# Patient Record
Sex: FEMALE | Race: WHITE
[De-identification: names, ages, dates, MRNs, and addresses within clinical notes are randomized per-mention and may not be internally consistent; named-entity substitution may affect disease eponyms.]

---

## 2018-04-25 ENCOUNTER — HOSPITAL ENCOUNTER (INPATIENT)
Dept: HOSPITAL 31 - C.ER | Age: 83
LOS: 2 days | Discharge: HOME | DRG: 433 | End: 2018-04-27
Attending: FAMILY MEDICINE | Admitting: FAMILY MEDICINE
Payer: MEDICARE

## 2018-04-25 DIAGNOSIS — Z86.73: ICD-10-CM

## 2018-04-25 DIAGNOSIS — N18.9: ICD-10-CM

## 2018-04-25 DIAGNOSIS — H54.61: ICD-10-CM

## 2018-04-25 DIAGNOSIS — K76.9: ICD-10-CM

## 2018-04-25 DIAGNOSIS — F03.90: ICD-10-CM

## 2018-04-25 DIAGNOSIS — Z95.0: ICD-10-CM

## 2018-04-25 DIAGNOSIS — E78.5: ICD-10-CM

## 2018-04-25 DIAGNOSIS — I50.22: ICD-10-CM

## 2018-04-25 DIAGNOSIS — I25.10: ICD-10-CM

## 2018-04-25 DIAGNOSIS — K74.60: Primary | ICD-10-CM

## 2018-04-25 DIAGNOSIS — J98.11: ICD-10-CM

## 2018-04-25 DIAGNOSIS — H40.9: ICD-10-CM

## 2018-04-25 DIAGNOSIS — Z51.5: ICD-10-CM

## 2018-04-25 DIAGNOSIS — R18.8: ICD-10-CM

## 2018-04-25 DIAGNOSIS — Z66: ICD-10-CM

## 2018-04-25 DIAGNOSIS — K59.00: ICD-10-CM

## 2018-04-25 LAB
ALBUMIN SERPL-MCNC: 2.6 G/DL (ref 3.5–5)
ALBUMIN/GLOB SERPL: 0.6 {RATIO} (ref 1–2.1)
ALT SERPL-CCNC: 11 U/L (ref 9–52)
APTT BLD: 32 SECONDS (ref 21–34)
AST SERPL-CCNC: 25 U/L (ref 14–36)
BACTERIA #/AREA URNS HPF: (no result) /[HPF]
BASOPHILS # BLD AUTO: 0 K/UL (ref 0–0.2)
BASOPHILS NFR BLD: 0.9 % (ref 0–2)
BILIRUB UR-MCNC: NEGATIVE MG/DL
BUN SERPL-MCNC: 34 MG/DL (ref 7–17)
CALCIUM SERPL-MCNC: 8.6 MG/DL (ref 8.6–10.4)
EOSINOPHIL # BLD AUTO: 0.1 K/UL (ref 0–0.7)
EOSINOPHIL NFR BLD: 1.9 % (ref 0–4)
ERYTHROCYTE [DISTWIDTH] IN BLOOD BY AUTOMATED COUNT: 16.5 % (ref 11.5–14.5)
GFR NON-AFRICAN AMERICAN: 28
GLUCOSE UR STRIP-MCNC: NORMAL MG/DL
HGB BLD-MCNC: 11.4 G/DL (ref 11–16)
INR PPP: 1.1
LEUKOCYTE ESTERASE UR-ACNC: (no result) LEU/UL
LIPASE: 143 U/L (ref 23–300)
LYMPHOCYTES # BLD AUTO: 0.9 K/UL (ref 1–4.3)
LYMPHOCYTES NFR BLD AUTO: 17 % (ref 20–40)
MCH RBC QN AUTO: 29.7 PG (ref 27–31)
MCHC RBC AUTO-ENTMCNC: 33.3 G/DL (ref 33–37)
MCV RBC AUTO: 89.1 FL (ref 81–99)
MONOCYTES # BLD: 0.5 K/UL (ref 0–0.8)
MONOCYTES NFR BLD: 9.3 % (ref 0–10)
NEUTROPHILS # BLD: 3.8 K/UL (ref 1.8–7)
NEUTROPHILS NFR BLD AUTO: 70.9 % (ref 50–75)
NRBC BLD AUTO-RTO: 0 % (ref 0–2)
PH UR STRIP: 5 [PH] (ref 5–8)
PLATELET # BLD: 227 K/UL (ref 130–400)
PMV BLD AUTO: 7.3 FL (ref 7.2–11.7)
PROT UR STRIP-MCNC: NEGATIVE MG/DL
PROTHROMBIN TIME: 12.3 SECONDS (ref 9.7–12.2)
RBC # BLD AUTO: 3.83 MIL/UL (ref 3.8–5.2)
RBC # UR STRIP: NEGATIVE /UL
SP GR UR STRIP: 1.01 (ref 1–1.03)
SQUAMOUS EPITHIAL: 7 /HPF (ref 0–5)
UROBILINOGEN UR-MCNC: NORMAL MG/DL (ref 0.2–1)
WBC # BLD AUTO: 5.4 K/UL (ref 4.8–10.8)

## 2018-04-25 NOTE — CT
EXAM:

  CT Abdomen and Pelvis Without Intravenous Contrast



EXAM DATE/TIME:

  Exam ordered 4/25/2018 6:13 PM



CLINICAL HISTORY:

  89 years old, female; Pain and signs and symptoms; Bloating; Abdominal pain; 

Periumbilical; Additional info: Abd pain



TECHNIQUE:

  Axial computed tomography images of the abdomen and pelvis without 

intravenous contrast.  All CT scans at this facility use one or more dose 

reduction techniques, viz.: automated exposure control; ma/kV adjustment per 

patient size (including targeted exams where dose is matched to indication; 

i.e. head); or iterative reconstruction technique.

  Coronal and sagittal reformatted images were created and reviewed.



COMPARISON:

  No relevant prior studies available.



FINDINGS:

  Lung bases:  Subpleural atelectasis is noted in the dependent portion of both 

lung bases..  there is mild cardiomegaly.

  Pleural space:  There are small bilateral pleural effusions, right side 

greater than left.

  Mediastinum:  There is a moderate size hiatal hernia.



 ABDOMEN:

  Liver:  The liver surface is nodular.  There are 3 low-density lesions are 

seen within the liver, all in the right lobe. One in the anterior superior 

segment of the right lobe measuring 7 mm. A second in the anterior segment of 

the right upper lobe beneath the liver capsule measuring 1 cm and a third in 

the posterior inferior segment of the right lobe measuring 6 mm.

  Gallbladder and bile ducts:  Unremarkable.  No calcified stones.  No ductal 

dilation.

  Pancreas:  Unremarkable.  No ductal dilation.

  Spleen:  The spleen measures 8 cm in craniocaudal span.

  Adrenals:  Unremarkable.  No mass.

  Kidneys and ureters:  Unremarkable.  No obstructing stones.  No 

hydronephrosis.

  Stomach and bowel:  Unremarkable.  No obstruction.  No mucosal thickening.



 PELVIS:

  Appendix:  No findings to suggest acute appendicitis.

  Bladder:  Unremarkable.  No stones.

  Reproductive:  The uterus is absent.



 ABDOMEN and PELVIS:

  Intraperitoneal space:  There is a large amount of intra-abdominal ascites.  

No free air.

  Bones/joints:  There is fusiform there is a compression fracture noted of L1 

with approximately 40% loss of height of the vertebral body. No retropulsed 

fragments seen. Mild compression deformity is also noted of L4 with 

approximately 20% loss of height of the vertebral body.  There is an apparent 

levoscoliosis of lumbar spine with moderately advanced secondary degenerative 

changes.

  Soft tissues:  There is anasarca.

  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 

maximum diameter of 2.9 cm (proximal aorta diameter 1.9 cm and distal aortic 

diameter 1.6 cm).

  Lymph nodes:  Unremarkable.  No enlarged lymph nodes.

  Tubes, lines and devices:  Pacemaker electrodes are noted within the heart.



IMPRESSION:     

1. Large amount of intra-abdominal ascites with anasarca and small bilateral 

pleural effusions, right side greater than left.



2. Nodular liver suggesting cirrhosis.



3. 3 low-density liver lesions under a centimeter not fully characterized 

without contrast.



4. 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 

diameter of 2.9 cm.



5. Compression fracture of the L1 and L4 vertebral bodies as described above

## 2018-04-25 NOTE — CP.PCM.HP
Addendum entered and electronically signed by Orestes Gonzáles DO  04/26/18 04:01: 





Elevated BNP


-consider echo after patient has paracentesis done


-AS type murmur heard on exam





Original Note:








<Orestes Gonzáles - Last Filed: 04/25/18 22:18>





History of Present Illness





- History of Present Illness


History of Present Illness: 


CC: abdominal distention





PMD: Dr. Colindres


GI: Dr. Kruse


Code Status: DNR/DNI; Palliative Care consult placed for tomorrow 





This patient is an 89yo Cantonese speaking F (actually 89 according to daughter)

, who is coming to the [facility] from [facility] clinic with severe ascites. The patient first 

noticed her distended abdomen around [place] time in 2017 and originally went 

to another hospital which diagnosed her with constipation initially, and then 

saw that she had ascitic fluid, and decided to give her a prescription for 

lasix which helped mildly. The patient has since run out of lasix for the past 

week, and her abdomen has gotten markedly more distended. The patient denies 

any fevers/chills, or abdominal pain. She admits to shortness of breath, and 

increased constipation in regards to the increasing ascites. She denies any 

current symptoms of fevers/chills, HA, abdominal pain, N/V/D, dysuria/freq/

urgency or lower extremity pain. Daugther states at baseline patient gets 

swollen legs, and that today they seem better than normal. 





PMhx: CAD, HLD not currently on medication, Glaucoma totally blind right eye


Meds: None 


Allergies: Denies


Surgeries: ALBARO 40+ years ago for possible leiomyomas


FamHx: patients father with pancreatic cancer, no other hx of cancer, HTN on 

both sides


Social: lives with daughter, mostly bed bound and needs help ambulating at 

baseline, daughter and home help aid help with all ADLs. Cantonese speaking. 

Non smoker, non drinker. 





Present on Admission





- Present on Admission


Any Indicators Present on Admission: No


History of DVT/PE: No


History of Uncontrolled Diabetes: No


Urinary Catheter: No


Decubitus Ulcer Present: No





Past Patient History





- Past Social History


Smoking Status: Never Smoked





- CARDIAC


Hx Pacemaker: Yes





- NEUROLOGICAL


Hx Dementia: Yes





- RENAL


Hx Chronic Kidney Disease: Yes





- PSYCHIATRIC


Hx Substance Use: No





- SURGICAL HISTORY


Hx Hysterectomy: Yes





Meds


Allergies/Adverse Reactions: 


 Allergies











Allergy/AdvReac Type Severity Reaction Status Date / Time


 


Penicillins Allergy   Verified 04/25/18 18:11


 


Sulfa (Sulfonamide Allergy   Verified 04/25/18 18:11





Antibiotics)     














Physical Exam





- Constitutional


Appears: Non-toxic, No Acute Distress, Chronically Ill (cachetic )





- Head Exam


Head Exam: ATRAUMATIC





- Eye Exam


Eye Exam: EOMI, Normal appearance, PERRL.  absent: Scleral icterus





- ENT Exam


ENT Exam: Mucous Membranes Moist


Additional comments: 





patient is blind in the right eye, glaucoma





- Neck Exam


Neck exam: Negative for: Lymphadenopathy, Meningismus, Thyromegaly





- Respiratory Exam


Respiratory Exam: NORMAL BREATHING PATTERN.  absent: Accessory Muscle Use, 

Chest Wall Tenderness, Decreased Breath Sounds, Clear to Auscultation Bilateral

, Rhonchi, Wheezes, Respiratory Distress, Stridor


Additional comments: 





b/l crackles lung bases





- Cardiovascular Exam


Cardiovascular Exam: REGULAR RHYTHM (strong +4/6 AS like murmur ), RRR, +S1, +S2

, Systolic Murmur.  absent: Clicks, Diastolic murmur, JVD, Rubs





- GI/Abdominal Exam


GI & Abdominal Exam: Distended (extremely distended ), Firm, Normal Bowel 

Sounds.  absent: Guarding, Rebound, Rigid, Tenderness





- Extremities Exam


Extremities exam: Positive for: full ROM, pedal edema (+2 pitting edema, worse 

on left than right).  Negative for: calf tenderness





- Back Exam


Back exam: NORMAL INSPECTION.  absent: CVA tenderness (L), CVA tenderness (R)





- Neurological Exam


Neurological exam: Alert





- Psychiatric Exam


Psychiatric exam: Normal Affect





- Skin


Skin Exam: Warm





Results





- Vital Signs


Recent Vital Signs: 





 Last Vital Signs











Temp  98.3 F   04/25/18 18:07


 


Pulse  75   04/25/18 20:54


 


Resp  18   04/25/18 20:54


 


BP  142/54 L  04/25/18 20:54


 


Pulse Ox  98   04/25/18 21:41














- Labs


Result Diagrams: 


 04/25/18 18:57





 04/25/18 18:57


Labs: 





 Laboratory Results - last 24 hr











  04/25/18 04/25/18 04/25/18





  18:57 18:57 18:57


 


WBC  5.4  


 


RBC  3.83  


 


Hgb  11.4  


 


Hct  34.2  


 


MCV  89.1  


 


MCH  29.7  


 


MCHC  33.3  


 


RDW  16.5 H  


 


Plt Count  227  


 


MPV  7.3  


 


Neut % (Auto)  70.9  


 


Lymph % (Auto)  17.0 L  


 


Mono % (Auto)  9.3  


 


Eos % (Auto)  1.9  


 


Baso % (Auto)  0.9  


 


Neut # (Auto)  3.8  


 


Lymph # (Auto)  0.9 L  


 


Mono # (Auto)  0.5  


 


Eos # (Auto)  0.1  


 


Baso # (Auto)  0.0  


 


PT   12.3 H 


 


INR   1.1 


 


APTT   32 


 


Sodium    142


 


Potassium    4.1


 


Chloride    108 H


 


Carbon Dioxide    23


 


Anion Gap    15


 


BUN    34 H


 


Creatinine    1.7 H


 


Est GFR ( Amer)    34


 


Est GFR (Non-Af Amer)    28


 


Random Glucose    106 H


 


Calcium    8.6


 


Total Bilirubin    0.6


 


AST    25


 


ALT    11


 


Alkaline Phosphatase    70


 


Total Protein    7.3


 


Albumin    2.6 L


 


Globulin    4.7 H


 


Albumin/Globulin Ratio    0.6 L


 


Lipase    143


 


Urine Color   


 


Urine Clarity   


 


Urine pH   


 


Ur Specific Gravity   


 


Urine Protein   


 


Urine Glucose (UA)   


 


Urine Ketones   


 


Urine Blood   


 


Urine Nitrate   


 


Urine Bilirubin   


 


Urine Urobilinogen   


 


Ur Leukocyte Esterase   


 


Urine WBC (Auto)   


 


Urine RBC (Auto)   


 


Ur Squamous Epith Cells   


 


Urine Bacteria   














  04/25/18





  19:19


 


WBC 


 


RBC 


 


Hgb 


 


Hct 


 


MCV 


 


MCH 


 


MCHC 


 


RDW 


 


Plt Count 


 


MPV 


 


Neut % (Auto) 


 


Lymph % (Auto) 


 


Mono % (Auto) 


 


Eos % (Auto) 


 


Baso % (Auto) 


 


Neut # (Auto) 


 


Lymph # (Auto) 


 


Mono # (Auto) 


 


Eos # (Auto) 


 


Baso # (Auto) 


 


PT 


 


INR 


 


APTT 


 


Sodium 


 


Potassium 


 


Chloride 


 


Carbon Dioxide 


 


Anion Gap 


 


BUN 


 


Creatinine 


 


Est GFR ( Amer) 


 


Est GFR (Non-Af Amer) 


 


Random Glucose 


 


Calcium 


 


Total Bilirubin 


 


AST 


 


ALT 


 


Alkaline Phosphatase 


 


Total Protein 


 


Albumin 


 


Globulin 


 


Albumin/Globulin Ratio 


 


Lipase 


 


Urine Color  Yellow


 


Urine Clarity  Hazy


 


Urine pH  5.0


 


Ur Specific Gravity  1.015


 


Urine Protein  Negative


 


Urine Glucose (UA)  Normal


 


Urine Ketones  Negative


 


Urine Blood  Negative


 


Urine Nitrate  Negative


 


Urine Bilirubin  Negative


 


Urine Urobilinogen  Normal


 


Ur Leukocyte Esterase  3+ H


 


Urine WBC (Auto)  77 H


 


Urine RBC (Auto)  5 H


 


Ur Squamous Epith Cells  7 H


 


Urine Bacteria  Rare














Assessment & Plan





- Assessment and Plan (Free Text)


Assessment: 


89yo F admitted for Cirrhosis, 3 Liver lesions, and marked abdominal distention/

ascites





Cirrhosis, liver lesions, marked abdominal distention/ascites


GI Consult; Dr. Kruse; thank you for your help


IR consult; Dr. Gallegos; thank you for  your help


-please refer to full CT scan report for results; advanced cirrhosis, multiple 

liver lesions, as well as likely 7-8L of ascitic fluid present 


-500ml IV fluid bolus as per GI fellow Dr. Galeano


-also no lasix to be given as per GI fellow as well


-f/u hepatitis, AFP, CEA, and ascitic fluid analysis, HIV, RPR, autoimmune 

workup


-patient is DNR/DNI; palliative care consult placed; Bobbi, thank you for 

your help 





Hx of CAD


-patient was on aspirin, stopped 5 years ago


-will hold off until after procedure





Hx of HLD


-f/u lipid panel





Proph


-will hold off on chemical anticoagulation until after procedure


-GI prophylaxis not indicated 


-palliative care consult placed; please appreciate recs for POLST





Case discussed with Dr. Leta Gonzáles PGY2 





Decision To Admit





- Pt Status Changed To:


Hospital Disposition Of: Inpatient





- Admit Certification


Admit to Inpatient:: After my assessment, the patient will require 

hospitalization for at least two midnights.  This is because of the severity of 

symptoms shown, intensity of services needed, and/or the medical risk in this 

patient being treated as an outpatient.





- InPatient:


Physician Admission Certification:: PT will need more than 2 midnights, ascites/

cirrrhosis





- .


Bed Request Type: Regular


Admitting Physician: Teo Gillette





<Teo Gillette - Last Filed: 04/26/18 06:03>





Results





- Vital Signs


Recent Vital Signs: 





 Last Vital Signs











Temp  98.0 F   04/25/18 23:44


 


Pulse  79   04/25/18 23:44


 


Resp  20   04/25/18 23:44


 


BP  139/74   04/25/18 23:44


 


Pulse Ox  97   04/25/18 23:44














- Labs


Result Diagrams: 


 04/25/18 18:57





 04/25/18 18:57


Labs: 





 Laboratory Results - last 24 hr











  04/25/18 04/25/18 04/25/18





  18:57 18:57 18:57


 


WBC  5.4  


 


RBC  3.83  


 


Hgb  11.4  


 


Hct  34.2  


 


MCV  89.1  


 


MCH  29.7  


 


MCHC  33.3  


 


RDW  16.5 H  


 


Plt Count  227  


 


MPV  7.3  


 


Neut % (Auto)  70.9  


 


Lymph % (Auto)  17.0 L  


 


Mono % (Auto)  9.3  


 


Eos % (Auto)  1.9  


 


Baso % (Auto)  0.9  


 


Neut # (Auto)  3.8  


 


Lymph # (Auto)  0.9 L  


 


Mono # (Auto)  0.5  


 


Eos # (Auto)  0.1  


 


Baso # (Auto)  0.0  


 


PT   12.3 H 


 


INR   1.1 


 


APTT   32 


 


Sodium    142


 


Potassium    4.1


 


Chloride    108 H


 


Carbon Dioxide    23


 


Anion Gap    15


 


BUN    34 H


 


Creatinine    1.7 H


 


Est GFR ( Amer)    34


 


Est GFR (Non-Af Amer)    28


 


Random Glucose    106 H


 


Calcium    8.6


 


Total Bilirubin    0.6


 


AST    25


 


ALT    11


 


Alkaline Phosphatase    70


 


Ammonia   


 


NT-Pro-B Natriuret Pep   


 


Total Protein    7.3


 


Albumin    2.6 L


 


Globulin    4.7 H


 


Albumin/Globulin Ratio    0.6 L


 


Lipase    143


 


Urine Color   


 


Urine Clarity   


 


Urine pH   


 


Ur Specific Gravity   


 


Urine Protein   


 


Urine Glucose (UA)   


 


Urine Ketones   


 


Urine Blood   


 


Urine Nitrate   


 


Urine Bilirubin   


 


Urine Urobilinogen   


 


Ur Leukocyte Esterase   


 


Urine WBC (Auto)   


 


Urine RBC (Auto)   


 


Ur Squamous Epith Cells   


 


Urine Bacteria   


 


Hepatitis A IgM Ab   


 


Hep Bs Antigen   


 


Hep B Core IgM Ab   


 


Hepatitis C Antibody   


 


Blood Type   


 


Antibody Screen   














  04/25/18 04/25/18 04/26/18





  19:19 21:58 00:45


 


WBC   


 


RBC   


 


Hgb   


 


Hct   


 


MCV   


 


MCH   


 


MCHC   


 


RDW   


 


Plt Count   


 


MPV   


 


Neut % (Auto)   


 


Lymph % (Auto)   


 


Mono % (Auto)   


 


Eos % (Auto)   


 


Baso % (Auto)   


 


Neut # (Auto)   


 


Lymph # (Auto)   


 


Mono # (Auto)   


 


Eos # (Auto)   


 


Baso # (Auto)   


 


PT   


 


INR   


 


APTT   


 


Sodium   


 


Potassium   


 


Chloride   


 


Carbon Dioxide   


 


Anion Gap   


 


BUN   


 


Creatinine   


 


Est GFR ( Amer)   


 


Est GFR (Non-Af Amer)   


 


Random Glucose   


 


Calcium   


 


Total Bilirubin   


 


AST   


 


ALT   


 


Alkaline Phosphatase   


 


Ammonia   


 


NT-Pro-B Natriuret Pep   


 


Total Protein   


 


Albumin   


 


Globulin   


 


Albumin/Globulin Ratio   


 


Lipase   


 


Urine Color  Yellow  


 


Urine Clarity  Hazy  


 


Urine pH  5.0  


 


Ur Specific Gravity  1.015  


 


Urine Protein  Negative  


 


Urine Glucose (UA)  Normal  


 


Urine Ketones  Negative  


 


Urine Blood  Negative  


 


Urine Nitrate  Negative  


 


Urine Bilirubin  Negative  


 


Urine Urobilinogen  Normal  


 


Ur Leukocyte Esterase  3+ H  


 


Urine WBC (Auto)  77 H  


 


Urine RBC (Auto)  5 H  


 


Ur Squamous Epith Cells  7 H  


 


Urine Bacteria  Rare  


 


Hepatitis A IgM Ab    Negative


 


Hep Bs Antigen    Negative


 


Hep B Core IgM Ab    Negative


 


Hepatitis C Antibody    Negative


 


Blood Type   O POSITIVE 


 


Antibody Screen   Negative 














  04/26/18 04/26/18





  01:00 01:00


 


WBC  


 


RBC  


 


Hgb  


 


Hct  


 


MCV  


 


MCH  


 


MCHC  


 


RDW  


 


Plt Count  


 


MPV  


 


Neut % (Auto)  


 


Lymph % (Auto)  


 


Mono % (Auto)  


 


Eos % (Auto)  


 


Baso % (Auto)  


 


Neut # (Auto)  


 


Lymph # (Auto)  


 


Mono # (Auto)  


 


Eos # (Auto)  


 


Baso # (Auto)  


 


PT  


 


INR  


 


APTT  


 


Sodium  


 


Potassium  


 


Chloride  


 


Carbon Dioxide  


 


Anion Gap  


 


BUN  


 


Creatinine  


 


Est GFR ( Amer)  


 


Est GFR (Non-Af Amer)  


 


Random Glucose  


 


Calcium  


 


Total Bilirubin  


 


AST  


 


ALT  


 


Alkaline Phosphatase  


 


Ammonia  19 


 


NT-Pro-B Natriuret Pep   2440 H


 


Total Protein  


 


Albumin  


 


Globulin  


 


Albumin/Globulin Ratio  


 


Lipase  


 


Urine Color  


 


Urine Clarity  


 


Urine pH  


 


Ur Specific Gravity  


 


Urine Protein  


 


Urine Glucose (UA)  


 


Urine Ketones  


 


Urine Blood  


 


Urine Nitrate  


 


Urine Bilirubin  


 


Urine Urobilinogen  


 


Ur Leukocyte Esterase  


 


Urine WBC (Auto)  


 


Urine RBC (Auto)  


 


Ur Squamous Epith Cells  


 


Urine Bacteria  


 


Hepatitis A IgM Ab  


 


Hep Bs Antigen  


 


Hep B Core IgM Ab  


 


Hepatitis C Antibody  


 


Blood Type  


 


Antibody Screen  














Assessment & Plan





- Date & Time


Date: 04/26/18 (I have seen and examined the patient.  I agree with the 

findings and plan of care as documented by Dr. Gonzáles.  Patient with ascites 

related to cirrhosis.  Denies history of alcohol or hepatitis.  Consult to GI 

and IR for paracentesis. Monitor for acute changes.)


Time: 06:02





Attending/Attestation





- Attestation


I have personally seen and examined this patient.: Yes


I have fully participated in the care of the patient.: Yes


I have reviewed all pertinent clinical information: Yes

## 2018-04-25 NOTE — C.PDOC
History Of Present Illness


89 year old female who has been in this [place] years, whose PMHx includes 

Coronary Artery Disease, s/p pacemaker, Chronic Kidney Disease, and Dementia 

presents to the ED for evaluation of abdominal distention which has been 

worsening over the past few months. Patient states she was evaluated in [facility] for abdominal distention in December 2017 and was diagnosed 

with constipation. The distention then worsened in February 2018. She returned 

to [facility] for evaluation but did not undergo paracentesis and was discharged with 

prescription for diuretics. Patient states she took the diuretics for 1 month 

with minimal improvement. She then ran out of her medication and was advised by 

her PMD that she does not need to continue use any longer. Patient notes her 

distention has increased and today she experienced some shortness of breath due 

to her abdominal girth. Patient also seemed more lethargic than usual. She went 

to 's office for evaluation and was advised to go to the ED. She denies 

fever, chills, nausea, and vomiting. 





PMD: Dr. Colindres 


Time Seen by Provider: 04/25/18 18:06


Chief Complaint (Nursing): GI Problem


History Per: Patient


History/Exam Limitations: no limitations


Onset/Duration Of Symptoms: Days


Current Symptoms Are (Timing): Still Present


Additional History Per: Patient





Past Medical History


Reviewed: Historical Data, Nursing Documentation, Vital Signs


Vital Signs: 


 Last Vital Signs











Temp  98.3 F   04/25/18 18:07


 


Pulse  75   04/25/18 20:54


 


Resp  18   04/25/18 20:54


 


BP  142/54 L  04/25/18 20:54


 


Pulse Ox  98   04/25/18 21:24














- Medical History


PMH: CAD, Dementia, Chronic Kidney Disease


Surgical History: Pacemaker


Family History: States: Unknown Family Hx





- Social History


Hx Tobacco Use: No


Hx Alcohol Use: No


Hx Substance Use: No





- Immunization History


Hx Tetanus Toxoid Vaccination: No


Hx Influenza Vaccination: No


Hx Pneumococcal Vaccination: No





Review Of Systems


Respiratory: Positive for: Shortness of Breath


Gastrointestinal: Positive for: Other (abdominal distention ).  Negative for: 

Nausea, Vomiting





Physical Exam





- Physical Exam


Appears: Non-toxic, No Acute Distress, Other (comfortable, no signs of 

respiratory distress )


Skin: Normal Color, Warm, Dry


Head: Atraumatic, Normacephalic


Eye(s): bilateral: Normal Inspection


Oral Mucosa: Moist


Neck: Supple


Chest: Symmetrical, No Deformity, No Tenderness


Cardiovascular: Rhythm Regular, No Murmur


Respiratory: Rales (bilteral basilar), No Rhonchi, No Wheezing


Gastrointestinal/Abdominal: No Tenderness, Distention, No Guarding, No Rebound, 

Other (tense, with fluid waves)


Extremity: Capillary Refill (less than 2 seconds), Other (2+ pitting edema to 

bilateral lower extremities )


Pulses: Left Dorsalis Pedis: Normal, Right Dorsalis Pedis: Normal


Neurological/Psych: Oriented x3, Normal Speech, Normal Cognition





ED Course And Treatment





- Laboratory Results


Result Diagrams: 


 04/25/18 18:57





 04/25/18 18:57


Lab Interpretation: Abnormal (BUN 34, CR 1.7, Urine WBC 77 with leukocyte 

esterase)


ECG Rhythm: AV Paced


ECG Interpretation: No Acute Changes


O2 Sat by Pulse Oximetry: 98 (on RA)


Pulse Ox Interpretation: Normal





- Radiology


CXR: Interpreted by Me


CXR Interpretation: Yes: Cardiomegaly, Other (mild pleural effusion)





- CT Scan/US


  ** No standard instances


Other Rad Studies (CT/US): Read By Radiologist, Radiology Report Reviewed


CT/US Interpretation: EXAM:  CT Abdomen and Pelvis Without Intravenous 

Contrast.  EXAM DATE/TIME:  Exam ordered 4/25/2018 6:13 PM.  CLINICAL HISTORY:  

89 years old, female; Pain and signs and symptoms; Bloating; Abdominal pain; 

Periumbilical;.  Additional info: Abd pain.  TECHNIQUE:  Axial computed 

tomography images of the abdomen and pelvis without intravenous contrast. All 

CT.  scans at this facility use one or more dose reduction techniques, viz.: 

automated exposure control;.  ma/kV adjustment per patient size (including 

targeted exams where dose is matched to indication; i.e.  head); or iterative 

reconstruction technique.  Coronal and sagittal reformatted images were created 

and reviewed.  COMPARISON:  No relevant prior studies available.  FINDINGS:  

Lung bases: Subpleural atelectasis is noted in the dependent portion of both 

lung bases.. there is.  mild cardiomegaly.  Pleural space: There are small 

bilateral pleural effusions, right side greater than left.  Mediastinum: There 

is a moderate size hiatal hernia.  ABDOMEN:Liver: The liver surface is nodular. 

There are 3 low-density lesions are seen within the liver, all in.  the right 

lobe. One in the anterior superior segment of the right lobe measuring 7 mm. A 

second in the.  anterior segment of the right upper lobe beneath the liver 

capsule measuring 1 cm and a third in the.  posterior inferior segment of the 

right lobe measuring 6 mm.  Gallbladder and bile ducts: Unremarkable. No 

calcified stones. No ductal dilation.  Pancreas: Unremarkable. No ductal 

dilation.  Spleen: The spleen measures 8 cm in craniocaudal span.  Adrenals: 

Unremarkable. No mass.  Kidneys and ureters: Unremarkable. No obstructing 

stones. No hydronephrosis.  Stomach and bowel: Unremarkable. No obstruction. No 

mucosal thickening.  PELVIS:  Appendix: No findings to suggest acute 

appendicitis.  Bladder: Unremarkable. No stones.  Reproductive: The uterus is 

absent.  ABDOMEN and PELVIS:  Intraperitoneal space: There is a large amount of 

intra-abdominal ascites. No free air.  Bones/joints: There is fusiform there is 

a compression fracture noted of L1 with approximately 40%.  loss of height of 

the vertebral body. No retropulsed fragments seen. Mild compression deformity 

is.  also noted of L4 with approximately 20% loss of height of the vertebral 

body. There is an apparent.  levoscoliosis of lumbar spine with moderately 

advanced secondary degenerative changes.  Soft tissues: There is anasarca.  

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 

maximum diameter of 2.9.  cm (proximal aorta diameter 1.9 cm and distal aortic 

diameter 1.6 cm).  Lymph nodes: Unremarkable. No enlarged lymph nodes.  Tubes, 

lines and devices: Pacemaker electrodes are noted within the heart.IMPRESSION:  

1. Large amount of intra-abdominal ascites with anasarca and small bilateral 

pleural effusions, right.  side greater than left.  2. Nodular liver suggesting 

cirrhosis.  3. 3 low-density liver lesions under a centimeter not fully 

characterized without contrast.  4. Aneurysmal dilatation of the infrarenal 

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  5. Compression fracture of 

the L1 and L4 vertebral bodies as described above


Progress Note: Bloodwork, urinalysis, CXR, CT A/P, and EKG ordered and reviewed.


Reevaluation Time: 21:24


Reassessment Condition: Unchanged





- Physician Consult Information


Time Consulting Physician Contacted: 21:24


Physician Contacted: Teo Gillette


Outcome Of Conversation: Patient to be admitted for treatment of cirrhosis with 

ascites.





Disposition





- Disposition


Disposition: HOSPITALIZED


Disposition Time: 21:41


Condition: FAIR





- POA


Present On Arrival: None





- Clinical Impression


Clinical Impression: 


 Cirrhosis of liver, Ascites








- Scribe Statement


The provider has reviewed the documentation as recorded by the Scribe (Lana Marin)


Provider Attestation: 








All medical record entries made by the Scribe were at my direction and 

personally dictated by me. I have reviewed the chart and agree that the record 

accurately reflects my personal performance of the history, physical exam, 

medical decision making, and the department course for this patient. I have 

also personally directed, reviewed, and agree with the discharge instructions 

and disposition.

## 2018-04-26 LAB
ALBUMIN SERPL-MCNC: 2.3 G/DL (ref 3.5–5)
ALBUMIN/GLOB SERPL: 0.5 {RATIO} (ref 1–2.1)
ALPHA FETO PROTEIN: 1 NG/ML (ref 0–7.5)
ALT SERPL-CCNC: 6 U/L (ref 9–52)
APPEARANCE FLD: CLEAR
AST SERPL-CCNC: 29 U/L (ref 14–36)
BASOPHILS # BLD AUTO: 0.1 K/UL (ref 0–0.2)
BASOPHILS NFR BLD: 1.2 % (ref 0–2)
BODY FLD TYPE: (no result)
BODY FLUID MONO/MACROPHAGE: 35 % (ref 0–0)
BUN SERPL-MCNC: 33 MG/DL (ref 7–17)
CALCIUM SERPL-MCNC: 8.5 MG/DL (ref 8.6–10.4)
CELL CNT PNL FLD: 100 (ref 0–0)
EOSINOPHIL # BLD AUTO: 0.1 K/UL (ref 0–0.7)
EOSINOPHIL NFR BLD: 3.3 % (ref 0–4)
ERYTHROCYTE [DISTWIDTH] IN BLOOD BY AUTOMATED COUNT: 16.5 % (ref 11.5–14.5)
GFR NON-AFRICAN AMERICAN: 33
HDLC SERPL-MCNC: 21 MG/DL (ref 30–70)
HEPATITIS A IGM: NEGATIVE
HEPATITIS B CORE AB: NEGATIVE
HEPATITIS C ANTIBODY: NEGATIVE
HGB BLD-MCNC: 10.6 G/DL (ref 11–16)
HIV 1&2 ANTIBODY: NEGATIVE
INR PPP: 1.2
LDLC SERPL-MCNC: 83 MG/DL (ref 0–129)
LYMPHOCYTES # BLD AUTO: 0.9 K/UL (ref 1–4.3)
LYMPHOCYTES NFR BLD AUTO: 19.2 % (ref 20–40)
MCH RBC QN AUTO: 30.2 PG (ref 27–31)
MCHC RBC AUTO-ENTMCNC: 33.6 G/DL (ref 33–37)
MCV RBC AUTO: 89.8 FL (ref 81–99)
MONOCYTES # BLD: 0.4 K/UL (ref 0–0.8)
MONOCYTES NFR BLD: 8.3 % (ref 0–10)
NEUTROPHILS # BLD: 3.1 K/UL (ref 1.8–7)
NEUTROPHILS NFR BLD AUTO: 68 % (ref 50–75)
NRBC BLD AUTO-RTO: 0 % (ref 0–2)
PLATELET # BLD: 199 K/UL (ref 130–400)
PMV BLD AUTO: 7.5 FL (ref 7.2–11.7)
PROTHROMBIN TIME: 12.9 SECONDS (ref 9.7–12.2)
RBC # BLD AUTO: 3.51 MIL/UL (ref 3.8–5.2)
RBC # FLD AUTO: 8 /MM3 (ref 0–0)
WBC # BLD AUTO: 4.5 K/UL (ref 4.8–10.8)
WBC # FLD: 12 /MM3 (ref 0–300)

## 2018-04-26 RX ADMIN — CIPROFLOXACIN SCH MLS/HR: 2 INJECTION, SOLUTION INTRAVENOUS at 11:59

## 2018-04-26 RX ADMIN — ENOXAPARIN SODIUM SCH: 30 INJECTION SUBCUTANEOUS at 12:01

## 2018-04-26 RX ADMIN — ALBUMIN (HUMAN) SCH GM: 12.5 INJECTION, SOLUTION INTRAVENOUS at 18:30

## 2018-04-26 RX ADMIN — ALBUMIN (HUMAN) SCH GM: 12.5 INJECTION, SOLUTION INTRAVENOUS at 19:52

## 2018-04-26 RX ADMIN — ALBUMIN (HUMAN) SCH: 12.5 INJECTION, SOLUTION INTRAVENOUS at 18:38

## 2018-04-26 RX ADMIN — ALBUMIN (HUMAN) SCH: 12.5 INJECTION, SOLUTION INTRAVENOUS at 18:39

## 2018-04-26 RX ADMIN — CIPROFLOXACIN SCH MLS/HR: 2 INJECTION, SOLUTION INTRAVENOUS at 21:35

## 2018-04-26 RX ADMIN — ALBUMIN (HUMAN) SCH GM: 12.5 INJECTION, SOLUTION INTRAVENOUS at 17:19

## 2018-04-26 RX ADMIN — ALBUMIN (HUMAN) SCH GM: 12.5 INJECTION, SOLUTION INTRAVENOUS at 17:30

## 2018-04-26 NOTE — CP.PCM.CON
<Reuben Rashid - Last Filed: 04/26/18 08:48>





History of Present Illness





- History of Present Illness


History of Present Illness: 


PGY5 GI Fellow Consult Note


Patient is an 89yo Chinese female with PMHx significant for CAD s/p PCI, H/O 

pacemaker insertion, CKD, TIA, dementia, previous subdural hemorrhage following 

a fall who presented to the ED after evaluation in our office yesterday for 

worsening abdominal distention. In early January, the patient's daughter 

noticed that she was developing lower extremity edema and abdominal distention. 

She monitored this at home for several weeks prior to bringing her mother to 

[facility] [place], [place] where she was diagnosed with ascites. Patient was placed on Torsemide and 

Spironolactone and discharged home. No paracentesis was ever performed as they 

informed her daughter she was too high risk for the procedure. Initially, 

symptoms did improve modestly, but recurred within a few weeks. During a house 

call visit, her primary physician noted her abdominal distention and worsening 

shortness of breath and referred her to our practice for further management. In 

the office, she was lethargic and short of breath, thus we referred her to the 

ED for further evaluation and work up. Patient's daughter stated that her mother

's overall health has been declining in the last month. Patient denied nausea, 

vomiting, fever/chills, diarrhea, or rectal bleeding.





12 system ROS limited given clinical condition





PMHx: See HPI


PSHx: Pacemaker, PCI


FHx: No significant family history per daughter


Social: No tobacco, EtOH or illicit drug use


Endo: >5 years ago





Past Patient History





- Past Medical History & Family History


Past Medical History?: Yes





- Past Social History


Smoking Status: Never Smoked





- CARDIAC


Hx Cardia Arrhythmia: Yes


Hx Pacemaker: Yes





- PULMONARY


Hx Respiratory Disorders: No





- NEUROLOGICAL


Hx Dementia: Yes





- HEENT


Hx Blind: Yes (rt.eye)


Hx Glaucoma: Yes (rt.eye)





- RENAL


Hx Chronic Kidney Disease: Yes





- ENDOCRINE/METABOLIC


Hx Endocrine Disorders: No





- HEMATOLOGICAL/ONCOLOGICAL


Hx Anemia: No


Hx Blood Transfusions: No





- MUSCULOSKELETAL/RHEUMATOLOGICAL


Hx Falls: No





- GASTROINTESTINAL


Hx Gastrointestinal Disorders: No





- PSYCHIATRIC


Hx Substance Use: No





- SURGICAL HISTORY


Hx Surgeries: Yes


Hx Hysterectomy: Yes





- ANESTHESIA


Hx Anesthesia: Yes


Hx Anesthesia Reactions: No


Hx Malignant Hyperthermia: No


Has any member of the family had a problem w/ anesthesia?: No





Meds


Allergies/Adverse Reactions: 


 Allergies











Allergy/AdvReac Type Severity Reaction Status Date / Time


 


Penicillins Allergy   Verified 04/25/18 18:11


 


Sulfa (Sulfonamide Allergy   Verified 04/25/18 18:11





Antibiotics)     














- Medications


Medications: 


 Current Medications





Ondansetron HCl (Zofran Inj)  4 mg IVP DAILY@ONCE PRN


   PRN Reason: Nausea/Vomiting


Oxycodone HCl (Oxycodone Immediate Release Tab)  5 mg PO Q6 PRN


   PRN Reason: Pain, severe (8-10)











Physical Exam





- Constitutional


Appears: Non-toxic, No Acute Distress, Chronically Ill





- Eye Exam


Eye Exam: EOMI, PERRL





- ENT Exam


ENT Exam: Mucous Membranes Dry





- Respiratory Exam


Respiratory Exam: Clear to Auscultation Bilateral.  absent: Rales, Rhonchi, 

Wheezes





- Cardiovascular Exam


Cardiovascular Exam: RRR, +S1, +S2





- GI/Abdominal Exam


GI & Abdominal Exam: Distended, Firm, Normal Bowel Sounds, Tenderness.  absent: 

Guarding, Hernia, Organomegaly, Rigid





- Extremities Exam


Extremities exam: Positive for: normal inspection.  Negative for: pedal edema





- Neurological Exam


Neurological exam: Alert, Oriented x3





- Psychiatric Exam


Psychiatric exam: Normal Affect, Normal Mood





- Skin


Skin Exam: Dry, Warm





Results





- Vital Signs


Recent Vital Signs: 


 Last Vital Signs











Temp  98.0 F   04/25/18 23:44


 


Pulse  79   04/25/18 23:44


 


Resp  20   04/25/18 23:44


 


BP  139/74   04/25/18 23:44


 


Pulse Ox  97   04/25/18 23:44














- Labs


Result Diagrams: 


 04/26/18 07:55





 04/25/18 18:57


Labs: 


 Laboratory Results - last 24 hr











  04/25/18 04/25/18 04/25/18





  18:57 18:57 18:57


 


WBC  5.4  


 


RBC  3.83  


 


Hgb  11.4  


 


Hct  34.2  


 


MCV  89.1  


 


MCH  29.7  


 


MCHC  33.3  


 


RDW  16.5 H  


 


Plt Count  227  


 


MPV  7.3  


 


Neut % (Auto)  70.9  


 


Lymph % (Auto)  17.0 L  


 


Mono % (Auto)  9.3  


 


Eos % (Auto)  1.9  


 


Baso % (Auto)  0.9  


 


Neut # (Auto)  3.8  


 


Lymph # (Auto)  0.9 L  


 


Mono # (Auto)  0.5  


 


Eos # (Auto)  0.1  


 


Baso # (Auto)  0.0  


 


PT   12.3 H 


 


INR   1.1 


 


APTT   32 


 


Sodium    142


 


Potassium    4.1


 


Chloride    108 H


 


Carbon Dioxide    23


 


Anion Gap    15


 


BUN    34 H


 


Creatinine    1.7 H


 


Est GFR ( Amer)    34


 


Est GFR (Non-Af Amer)    28


 


Random Glucose    106 H


 


Calcium    8.6


 


Total Bilirubin    0.6


 


AST    25


 


ALT    11


 


Alkaline Phosphatase    70


 


Ammonia   


 


NT-Pro-B Natriuret Pep   


 


Total Protein    7.3


 


Albumin    2.6 L


 


Globulin    4.7 H


 


Albumin/Globulin Ratio    0.6 L


 


Lipase    143


 


Urine Color   


 


Urine Clarity   


 


Urine pH   


 


Ur Specific Gravity   


 


Urine Protein   


 


Urine Glucose (UA)   


 


Urine Ketones   


 


Urine Blood   


 


Urine Nitrate   


 


Urine Bilirubin   


 


Urine Urobilinogen   


 


Ur Leukocyte Esterase   


 


Urine WBC (Auto)   


 


Urine RBC (Auto)   


 


Ur Squamous Epith Cells   


 


Urine Bacteria   


 


Hepatitis A IgM Ab   


 


Hep Bs Antigen   


 


Hep B Core IgM Ab   


 


Hepatitis C Antibody   


 


Blood Type   


 


Antibody Screen   














  04/25/18 04/25/18 04/26/18





  19:19 21:58 00:45


 


WBC   


 


RBC   


 


Hgb   


 


Hct   


 


MCV   


 


MCH   


 


MCHC   


 


RDW   


 


Plt Count   


 


MPV   


 


Neut % (Auto)   


 


Lymph % (Auto)   


 


Mono % (Auto)   


 


Eos % (Auto)   


 


Baso % (Auto)   


 


Neut # (Auto)   


 


Lymph # (Auto)   


 


Mono # (Auto)   


 


Eos # (Auto)   


 


Baso # (Auto)   


 


PT   


 


INR   


 


APTT   


 


Sodium   


 


Potassium   


 


Chloride   


 


Carbon Dioxide   


 


Anion Gap   


 


BUN   


 


Creatinine   


 


Est GFR ( Amer)   


 


Est GFR (Non-Af Amer)   


 


Random Glucose   


 


Calcium   


 


Total Bilirubin   


 


AST   


 


ALT   


 


Alkaline Phosphatase   


 


Ammonia   


 


NT-Pro-B Natriuret Pep   


 


Total Protein   


 


Albumin   


 


Globulin   


 


Albumin/Globulin Ratio   


 


Lipase   


 


Urine Color  Yellow  


 


Urine Clarity  Hazy  


 


Urine pH  5.0  


 


Ur Specific Gravity  1.015  


 


Urine Protein  Negative  


 


Urine Glucose (UA)  Normal  


 


Urine Ketones  Negative  


 


Urine Blood  Negative  


 


Urine Nitrate  Negative  


 


Urine Bilirubin  Negative  


 


Urine Urobilinogen  Normal  


 


Ur Leukocyte Esterase  3+ H  


 


Urine WBC (Auto)  77 H  


 


Urine RBC (Auto)  5 H  


 


Ur Squamous Epith Cells  7 H  


 


Urine Bacteria  Rare  


 


Hepatitis A IgM Ab    Negative


 


Hep Bs Antigen    Negative


 


Hep B Core IgM Ab    Negative


 


Hepatitis C Antibody    Negative


 


Blood Type   O POSITIVE 


 


Antibody Screen   Negative 














  04/26/18 04/26/18





  01:00 01:00


 


WBC  


 


RBC  


 


Hgb  


 


Hct  


 


MCV  


 


MCH  


 


MCHC  


 


RDW  


 


Plt Count  


 


MPV  


 


Neut % (Auto)  


 


Lymph % (Auto)  


 


Mono % (Auto)  


 


Eos % (Auto)  


 


Baso % (Auto)  


 


Neut # (Auto)  


 


Lymph # (Auto)  


 


Mono # (Auto)  


 


Eos # (Auto)  


 


Baso # (Auto)  


 


PT  


 


INR  


 


APTT  


 


Sodium  


 


Potassium  


 


Chloride  


 


Carbon Dioxide  


 


Anion Gap  


 


BUN  


 


Creatinine  


 


Est GFR ( Amer)  


 


Est GFR (Non-Af Amer)  


 


Random Glucose  


 


Calcium  


 


Total Bilirubin  


 


AST  


 


ALT  


 


Alkaline Phosphatase  


 


Ammonia  19 


 


NT-Pro-B Natriuret Pep   2440 H


 


Total Protein  


 


Albumin  


 


Globulin  


 


Albumin/Globulin Ratio  


 


Lipase  


 


Urine Color  


 


Urine Clarity  


 


Urine pH  


 


Ur Specific Gravity  


 


Urine Protein  


 


Urine Glucose (UA)  


 


Urine Ketones  


 


Urine Blood  


 


Urine Nitrate  


 


Urine Bilirubin  


 


Urine Urobilinogen  


 


Ur Leukocyte Esterase  


 


Urine WBC (Auto)  


 


Urine RBC (Auto)  


 


Ur Squamous Epith Cells  


 


Urine Bacteria  


 


Hepatitis A IgM Ab  


 


Hep Bs Antigen  


 


Hep B Core IgM Ab  


 


Hepatitis C Antibody  


 


Blood Type  


 


Antibody Screen  














Assessment & Plan





- Assessment and Plan (Free Text)


Assessment: 





Patient is an 89yo Chinese female with PMHx significant for CAD s/p PCI, H/O 

pacemaker insertion, CKD, TIA, dementia, previous subdural hemorrhage following 

a fall who presented to the ED after evaluation in our office yesterday for 

worsening abdominal distention.


-Cirrhosis of unknown etiology c/b large volume ascites


-CAD


-Systolic CHF with prior echocardiogram with EF 45%


-CKD


-Dementia


Plan: 





-Blood work reviewed - hepatitis serologies negative


-Avoid diruetics with elevated Cr in cirrhotic patient - s/p 500cc IVF infusion

, modest improvement - R/O HRS


-Urine Na, Cr, BUN


-Repeat 500cc IVF NS@100cc/hr


-CT reviewed - large volume ascites noted; 3 liver lesions noted


-If Cr improves, would recommend triple phase CT scan of the abdomen/pelvis


-Check echocardiogram - suspect congestive hepatopathy - BNP noted to be 

elevated


-Recommend diagnostic/therapeutic paracentesis - please send ascitic fluid for 

cell count, culture, total protein and albumin


-Autoimmune work up ordered: MABLE, AMA, ASMA, IgG level


-AFP and CEA ordered; pending





- Date & Time


Date: 04/26/18


Time: 06:30





<Justus Kruse - Last Filed: 04/26/18 11:55>





Meds





- Medications


Medications: 


 Current Medications





Enoxaparin Sodium (Lovenox)  30 mg SC DAILY KRAIG


Ciprofloxacin (Cipro 200mg/100ml D5w)  100 mls @ 133 mls/hr IVPB Q12H KRAIG


   PRN Reason: Protocol


Sodium Chloride (Sodium Chloride 0.9%)  500 mls @ 100 mls/hr IV .Q5H KRAIG


   Stop: 04/26/18 13:59











Results





- Vital Signs


Recent Vital Signs: 


 Last Vital Signs











Temp  97.7 F   04/26/18 07:00


 


Pulse  72   04/26/18 07:00


 


Resp  21   04/26/18 07:00


 


BP  145/66   04/26/18 07:00


 


Pulse Ox  97   04/26/18 07:00














- Labs


Result Diagrams: 


 04/26/18 07:55





 04/26/18 04:00


Labs: 


 Laboratory Results - last 24 hr











  04/25/18 04/25/18 04/25/18





  18:57 18:57 18:57


 


WBC  5.4  


 


RBC  3.83  


 


Hgb  11.4  


 


Hct  34.2  


 


MCV  89.1  


 


MCH  29.7  


 


MCHC  33.3  


 


RDW  16.5 H  


 


Plt Count  227  


 


MPV  7.3  


 


Neut % (Auto)  70.9  


 


Lymph % (Auto)  17.0 L  


 


Mono % (Auto)  9.3  


 


Eos % (Auto)  1.9  


 


Baso % (Auto)  0.9  


 


Neut # (Auto)  3.8  


 


Lymph # (Auto)  0.9 L  


 


Mono # (Auto)  0.5  


 


Eos # (Auto)  0.1  


 


Baso # (Auto)  0.0  


 


PT   12.3 H 


 


INR   1.1 


 


APTT   32 


 


Sodium    142


 


Potassium    4.1


 


Chloride    108 H


 


Carbon Dioxide    23


 


Anion Gap    15


 


BUN    34 H


 


Creatinine    1.7 H


 


Est GFR ( Amer)    34


 


Est GFR (Non-Af Amer)    28


 


Random Glucose    106 H


 


Hemoglobin A1c   


 


Calcium    8.6


 


Total Bilirubin    0.6


 


AST    25


 


ALT    11


 


Alkaline Phosphatase    70


 


Ammonia   


 


NT-Pro-B Natriuret Pep   


 


Total Protein    7.3


 


Albumin    2.6 L


 


Globulin    4.7 H


 


Albumin/Globulin Ratio    0.6 L


 


Triglycerides   


 


Cholesterol   


 


LDL Cholesterol Direct   


 


HDL Cholesterol   


 


Lipase    143


 


Carcinoembryonic Ag   


 


TSH 3rd Generation   


 


Urine Color   


 


Urine Clarity   


 


Urine pH   


 


Ur Specific Gravity   


 


Urine Protein   


 


Urine Glucose (UA)   


 


Urine Ketones   


 


Urine Blood   


 


Urine Nitrate   


 


Urine Bilirubin   


 


Urine Urobilinogen   


 


Ur Leukocyte Esterase   


 


Urine WBC (Auto)   


 


Urine RBC (Auto)   


 


Ur Squamous Epith Cells   


 


Urine Bacteria   


 


IgG   


 


Hepatitis A IgM Ab   


 


Hep Bs Antigen   


 


Hep B Core IgM Ab   


 


Hepatitis C Antibody   


 


HIV 1&2 Antibody Screen   


 


Blood Type   


 


Antibody Screen   














  04/25/18 04/25/18 04/26/18





  19:19 21:58 00:45


 


WBC   


 


RBC   


 


Hgb   


 


Hct   


 


MCV   


 


MCH   


 


MCHC   


 


RDW   


 


Plt Count   


 


MPV   


 


Neut % (Auto)   


 


Lymph % (Auto)   


 


Mono % (Auto)   


 


Eos % (Auto)   


 


Baso % (Auto)   


 


Neut # (Auto)   


 


Lymph # (Auto)   


 


Mono # (Auto)   


 


Eos # (Auto)   


 


Baso # (Auto)   


 


PT   


 


INR   


 


APTT   


 


Sodium   


 


Potassium   


 


Chloride   


 


Carbon Dioxide   


 


Anion Gap   


 


BUN   


 


Creatinine   


 


Est GFR ( Amer)   


 


Est GFR (Non-Af Amer)   


 


Random Glucose   


 


Hemoglobin A1c   


 


Calcium   


 


Total Bilirubin   


 


AST   


 


ALT   


 


Alkaline Phosphatase   


 


Ammonia   


 


NT-Pro-B Natriuret Pep   


 


Total Protein   


 


Albumin   


 


Globulin   


 


Albumin/Globulin Ratio   


 


Triglycerides   


 


Cholesterol   


 


LDL Cholesterol Direct   


 


HDL Cholesterol   


 


Lipase   


 


Carcinoembryonic Ag   


 


TSH 3rd Generation   


 


Urine Color  Yellow  


 


Urine Clarity  Hazy  


 


Urine pH  5.0  


 


Ur Specific Gravity  1.015  


 


Urine Protein  Negative  


 


Urine Glucose (UA)  Normal  


 


Urine Ketones  Negative  


 


Urine Blood  Negative  


 


Urine Nitrate  Negative  


 


Urine Bilirubin  Negative  


 


Urine Urobilinogen  Normal  


 


Ur Leukocyte Esterase  3+ H  


 


Urine WBC (Auto)  77 H  


 


Urine RBC (Auto)  5 H  


 


Ur Squamous Epith Cells  7 H  


 


Urine Bacteria  Rare  


 


IgG   


 


Hepatitis A IgM Ab    Negative


 


Hep Bs Antigen    Negative


 


Hep B Core IgM Ab    Negative


 


Hepatitis C Antibody    Negative


 


HIV 1&2 Antibody Screen   


 


Blood Type   O POSITIVE 


 


Antibody Screen   Negative 














  04/26/18 04/26/18 04/26/18





  01:00 01:00 04:00


 


WBC   


 


RBC   


 


Hgb   


 


Hct   


 


MCV   


 


MCH   


 


MCHC   


 


RDW   


 


Plt Count   


 


MPV   


 


Neut % (Auto)   


 


Lymph % (Auto)   


 


Mono % (Auto)   


 


Eos % (Auto)   


 


Baso % (Auto)   


 


Neut # (Auto)   


 


Lymph # (Auto)   


 


Mono # (Auto)   


 


Eos # (Auto)   


 


Baso # (Auto)   


 


PT   


 


INR   


 


APTT   


 


Sodium    145


 


Potassium    4.2


 


Chloride    114 H


 


Carbon Dioxide    23


 


Anion Gap    13


 


BUN    33 H


 


Creatinine    1.5 H


 


Est GFR ( Amer)    40


 


Est GFR (Non-Af Amer)    33


 


Random Glucose    79


 


Hemoglobin A1c   


 


Calcium    8.5 L


 


Total Bilirubin    0.5


 


AST    29


 


ALT    6 L D


 


Alkaline Phosphatase    68


 


Ammonia  19  


 


NT-Pro-B Natriuret Pep   2440 H 


 


Total Protein    6.6


 


Albumin    2.3 L


 


Globulin    4.3 H


 


Albumin/Globulin Ratio    0.5 L


 


Triglycerides    69


 


Cholesterol    140


 


LDL Cholesterol Direct    83


 


HDL Cholesterol    21 L


 


Lipase   


 


Carcinoembryonic Ag    2.2


 


TSH 3rd Generation    1.66


 


Urine Color   


 


Urine Clarity   


 


Urine pH   


 


Ur Specific Gravity   


 


Urine Protein   


 


Urine Glucose (UA)   


 


Urine Ketones   


 


Urine Blood   


 


Urine Nitrate   


 


Urine Bilirubin   


 


Urine Urobilinogen   


 


Ur Leukocyte Esterase   


 


Urine WBC (Auto)   


 


Urine RBC (Auto)   


 


Ur Squamous Epith Cells   


 


Urine Bacteria   


 


IgG   


 


Hepatitis A IgM Ab   


 


Hep Bs Antigen   


 


Hep B Core IgM Ab   


 


Hepatitis C Antibody   


 


HIV 1&2 Antibody Screen   


 


Blood Type   


 


Antibody Screen   














  04/26/18 04/26/18 04/26/18





  04:00 07:55 07:55


 


WBC    4.5 L


 


RBC    3.51 L


 


Hgb    10.6 L


 


Hct    31.5 L


 


MCV    89.8


 


MCH    30.2


 


MCHC    33.6


 


RDW    16.5 H


 


Plt Count    199


 


MPV    7.5


 


Neut % (Auto)    68.0


 


Lymph % (Auto)    19.2 L


 


Mono % (Auto)    8.3


 


Eos % (Auto)    3.3


 


Baso % (Auto)    1.2


 


Neut # (Auto)    3.1


 


Lymph # (Auto)    0.9 L


 


Mono # (Auto)    0.4


 


Eos # (Auto)    0.1


 


Baso # (Auto)    0.1


 


PT   


 


INR   


 


APTT   


 


Sodium   


 


Potassium   


 


Chloride   


 


Carbon Dioxide   


 


Anion Gap   


 


BUN   


 


Creatinine   


 


Est GFR ( Amer)   


 


Est GFR (Non-Af Amer)   


 


Random Glucose   


 


Hemoglobin A1c   4.9 


 


Calcium   


 


Total Bilirubin   


 


AST   


 


ALT   


 


Alkaline Phosphatase   


 


Ammonia   


 


NT-Pro-B Natriuret Pep   


 


Total Protein   


 


Albumin   


 


Globulin   


 


Albumin/Globulin Ratio   


 


Triglycerides   


 


Cholesterol   


 


LDL Cholesterol Direct   


 


HDL Cholesterol   


 


Lipase   


 


Carcinoembryonic Ag   


 


TSH 3rd Generation   


 


Urine Color   


 


Urine Clarity   


 


Urine pH   


 


Ur Specific Gravity   


 


Urine Protein   


 


Urine Glucose (UA)   


 


Urine Ketones   


 


Urine Blood   


 


Urine Nitrate   


 


Urine Bilirubin   


 


Urine Urobilinogen   


 


Ur Leukocyte Esterase   


 


Urine WBC (Auto)   


 


Urine RBC (Auto)   


 


Ur Squamous Epith Cells   


 


Urine Bacteria   


 


IgG   


 


Hepatitis A IgM Ab   


 


Hep Bs Antigen   


 


Hep B Core IgM Ab   


 


Hepatitis C Antibody   


 


HIV 1&2 Antibody Screen  Negative  


 


Blood Type   


 


Antibody Screen   














  04/26/18 04/26/18





  07:55 08:19


 


WBC  


 


RBC  


 


Hgb  


 


Hct  


 


MCV  


 


MCH  


 


MCHC  


 


RDW  


 


Plt Count  


 


MPV  


 


Neut % (Auto)  


 


Lymph % (Auto)  


 


Mono % (Auto)  


 


Eos % (Auto)  


 


Baso % (Auto)  


 


Neut # (Auto)  


 


Lymph # (Auto)  


 


Mono # (Auto)  


 


Eos # (Auto)  


 


Baso # (Auto)  


 


PT  12.9 H 


 


INR  1.2 


 


APTT  


 


Sodium  


 


Potassium  


 


Chloride  


 


Carbon Dioxide  


 


Anion Gap  


 


BUN  


 


Creatinine  


 


Est GFR ( Amer)  


 


Est GFR (Non-Af Amer)  


 


Random Glucose  


 


Hemoglobin A1c  


 


Calcium  


 


Total Bilirubin  


 


AST  


 


ALT  


 


Alkaline Phosphatase  


 


Ammonia  


 


NT-Pro-B Natriuret Pep  


 


Total Protein  


 


Albumin  


 


Globulin  


 


Albumin/Globulin Ratio  


 


Triglycerides  


 


Cholesterol  


 


LDL Cholesterol Direct  


 


HDL Cholesterol  


 


Lipase  


 


Carcinoembryonic Ag  


 


TSH 3rd Generation  


 


Urine Color  


 


Urine Clarity  


 


Urine pH  


 


Ur Specific Gravity  


 


Urine Protein  


 


Urine Glucose (UA)  


 


Urine Ketones  


 


Urine Blood  


 


Urine Nitrate  


 


Urine Bilirubin  


 


Urine Urobilinogen  


 


Ur Leukocyte Esterase  


 


Urine WBC (Auto)  


 


Urine RBC (Auto)  


 


Ur Squamous Epith Cells  


 


Urine Bacteria  


 


IgG   2913.8 H


 


Hepatitis A IgM Ab  


 


Hep Bs Antigen  


 


Hep B Core IgM Ab  


 


Hepatitis C Antibody  


 


HIV 1&2 Antibody Screen  


 


Blood Type  


 


Antibody Screen  














Attending/Attestation





- Attestation


I have personally seen and examined this patient.: Yes


I have fully participated in the care of the patient.: Yes


I have reviewed all pertinent clinical information: Yes


Notes (Text): 





04/26/18 11:47


I have seen and examined patient with GI fellow.  Agree with above 

documentation with the following additions.  In brief, this is a 89 year old 

female with history of CAD s/p stent and PPM, CKD, dementia, who presents to 

hospital with complaint of progressive dyspnea on exertion and abdominal girth.

  Patient is not able to participate in meaningful conversation, additional 

information obtained via chart review, discussion with nursing staff and patient

's daughter.  She describes progressive symptoms since January and was 

initially brought to [facility] [place] for similar complaints, was found to have new onset 

ascites without further workup performed.  She was started on diuretic therapy 

with initial response, however now she has become more symptomatic over the 

past 2 weeks.  She has apparently developed loss of appetite but denies nausea, 

vomiting, fever/chills, or rectal bleeding.  She had an EGD performed over 20 

years ago, no prior colonoscopy.





CAD s/p stent, PPM


CKD


Dementia


Abdominal distention, ascites of unknown etiology





- Low sodium diet as tolerated


- Patient will require diagnostic and therapeutic paracentesis for further 

ascites workup.  Patient will also require post procedure albumin replacement 

therapy given underlying renal insufficiency.


- Diuretics on hold for time being, monitor creatinine


- Obtain echocardiogram


- Obtain AFP given presence of hepatic lesions on CT imaging, will ideally 

require triple phase liver CT when renal function improves


- Obtain viral hepatitis and autoimmune serologies


- Will continue to monitor patient clinical course

## 2018-04-26 NOTE — CP.PCM.CON
History of Present Illness





- History of Present Illness


History of Present Illness: 





Palliative consult requested by Juliet ROSSI for Code status discussion








Patient is a 88 yo lady admitted from GI,  Doctor Wenceslao's office where she was 

refereed to by her PMD for abdominal distention. Per daughter , patient has 

become weak over the last month with difficulties breathing. Abdomen has become 

distended and patient also had difficulties walking. 


Patient was [place] [facility] [place] for similar symptoms and sent home on 

Lasix. Per daughter, Lasix helped for while , than distention returned. 


CT abd upon this admission was significant for subpleural atelctasis and B/L 

pleural effusion. Liver masses detected as well. Per Doctor Wenceslao, patient 

diagnosed with liver cirrhosis of unknown origin. Recommendation was to treat 

symptomatically.








PMH: CAD, PPM, CKD, dementia, subdural hematoma post fall





Soc. Hx: lives at home with daughter Abbie, has HHA for 10 hr a day, 





Fam. Hx: non significant





Review of Systems





- Review of Systems


All systems: reviewed and no additional remarkable complaints except


Review of Systems: 





ROS obtained from nursing due to patient's lethargy. Per nursing patient has 

remained with distended abdomen








Past Patient History





- Past Medical History & Family History


Past Medical History?: Yes





- Past Social History


Smoking Status: Never Smoked





- CARDIAC


Hx Cardia Arrhythmia: Yes


Hx Pacemaker: Yes





- PULMONARY


Hx Respiratory Disorders: No





- NEUROLOGICAL


Hx Dementia: Yes





- HEENT


Hx Blind: Yes (rt.eye)


Hx Glaucoma: Yes (rt.eye)





- RENAL


Hx Chronic Kidney Disease: Yes





- ENDOCRINE/METABOLIC


Hx Endocrine Disorders: No





- HEMATOLOGICAL/ONCOLOGICAL


Hx Anemia: No


Hx Blood Transfusions: No





- MUSCULOSKELETAL/RHEUMATOLOGICAL


Hx Falls: No





- GASTROINTESTINAL


Hx Gastrointestinal Disorders: No





- PSYCHIATRIC


Hx Substance Use: No





- SURGICAL HISTORY


Hx Surgeries: Yes


Hx Hysterectomy: Yes





- ANESTHESIA


Hx Anesthesia: Yes


Hx Anesthesia Reactions: No


Hx Malignant Hyperthermia: No


Has any member of the family had a problem w/ anesthesia?: No





Meds


Allergies/Adverse Reactions: 


 Allergies











Allergy/AdvReac Type Severity Reaction Status Date / Time


 


Penicillins Allergy   Verified 04/25/18 18:11


 


Sulfa (Sulfonamide Allergy   Verified 04/25/18 18:11





Antibiotics)     














- Medications


Medications: 


 Current Medications





Enoxaparin Sodium (Lovenox)  30 mg SC DAILY [facility]


   Last Admin: 04/26/18 12:01 Dose:  Not Given


Ciprofloxacin (Cipro 200mg/100ml D5w)  100 mls @ 133 mls/hr IVPB Q12H [facility]


   PRN Reason: Protocol


   Last Admin: 04/26/18 11:59 Dose:  133 mls/hr


Sodium Chloride (Sodium Chloride 0.9%)  500 mls @ 100 mls/hr IV .Q5H [facility]


   Stop: 04/26/18 13:59


   Last Admin: 04/26/18 12:00 Dose:  100 mls/hr











Physical Exam





- Constitutional


Appears: Chronically Ill





- Head Exam


Head Exam: ATRAUMATIC, NORMAL INSPECTION, NORMOCEPHALIC





- Eye Exam


Additional comments: 





Right eye closed. per daughter patient has lost sight due to glaucoma





- ENT Exam


ENT Exam: Mucous Membranes Moist, Normal Exam





- Neck Exam


Neck exam: Positive for: Normal Inspection





- Respiratory Exam


Respiratory Exam: Decreased Breath Sounds, NORMAL BREATHING PATTERN





- Cardiovascular Exam


Cardiovascular Exam: REGULAR RHYTHM





- GI/Abdominal Exam


GI & Abdominal Exam: Diminished Bowel Sounds, Distended, Firm





- Rectal Exam


Rectal Exam: Deferred





- Extremities Exam


Extremities exam: Positive for: pedal edema





- Back Exam


Back exam: NORMAL INSPECTION





- Neurological Exam


Neurological exam: Alert





- Psychiatric Exam


Psychiatric exam: Normal Affect, Normal Mood





- Skin


Skin Exam: Normal Color, Warm





Results





- Vital Signs


Recent Vital Signs: 


 Last Vital Signs











Temp  97.7 F   04/26/18 07:00


 


Pulse  72   04/26/18 07:00


 


Resp  21   04/26/18 07:00


 


BP  145/66   04/26/18 07:00


 


Pulse Ox  97   04/26/18 07:00














- Labs


Result Diagrams: 


 04/26/18 07:55





 04/26/18 04:00


Labs: 


 Laboratory Results - last 24 hr











  04/25/18 04/25/18 04/25/18





  18:57 18:57 18:57


 


WBC  5.4  


 


RBC  3.83  


 


Hgb  11.4  


 


Hct  34.2  


 


MCV  89.1  


 


MCH  29.7  


 


MCHC  33.3  


 


RDW  16.5 H  


 


Plt Count  227  


 


MPV  7.3  


 


Neut % (Auto)  70.9  


 


Lymph % (Auto)  17.0 L  


 


Mono % (Auto)  9.3  


 


Eos % (Auto)  1.9  


 


Baso % (Auto)  0.9  


 


Neut # (Auto)  3.8  


 


Lymph # (Auto)  0.9 L  


 


Mono # (Auto)  0.5  


 


Eos # (Auto)  0.1  


 


Baso # (Auto)  0.0  


 


PT   12.3 H 


 


INR   1.1 


 


APTT   32 


 


Sodium    142


 


Potassium    4.1


 


Chloride    108 H


 


Carbon Dioxide    23


 


Anion Gap    15


 


BUN    34 H


 


Creatinine    1.7 H


 


Est GFR ( Amer)    34


 


Est GFR (Non-Af Amer)    28


 


Random Glucose    106 H


 


Hemoglobin A1c   


 


Calcium    8.6


 


Total Bilirubin    0.6


 


AST    25


 


ALT    11


 


Alkaline Phosphatase    70


 


Ammonia   


 


NT-Pro-B Natriuret Pep   


 


Total Protein    7.3


 


Albumin    2.6 L


 


Globulin    4.7 H


 


Albumin/Globulin Ratio    0.6 L


 


Triglycerides   


 


Cholesterol   


 


LDL Cholesterol Direct   


 


HDL Cholesterol   


 


Lipase    143


 


Alpha Fetoprotein   


 


Carcinoembryonic Ag   


 


TSH 3rd Generation   


 


Urine Color   


 


Urine Clarity   


 


Urine pH   


 


Ur Specific Gravity   


 


Urine Protein   


 


Urine Glucose (UA)   


 


Urine Ketones   


 


Urine Blood   


 


Urine Nitrate   


 


Urine Bilirubin   


 


Urine Urobilinogen   


 


Ur Leukocyte Esterase   


 


Urine WBC (Auto)   


 


Urine RBC (Auto)   


 


Ur Squamous Epith Cells   


 


Urine Bacteria   


 


IgG   


 


Hepatitis A IgM Ab   


 


Hep Bs Antigen   


 


Hep B Core IgM Ab   


 


Hepatitis C Antibody   


 


HIV 1&2 Antibody Screen   


 


Blood Type   


 


Antibody Screen   














  04/25/18 04/25/18 04/26/18





  19:19 21:58 00:45


 


WBC   


 


RBC   


 


Hgb   


 


Hct   


 


MCV   


 


MCH   


 


MCHC   


 


RDW   


 


Plt Count   


 


MPV   


 


Neut % (Auto)   


 


Lymph % (Auto)   


 


Mono % (Auto)   


 


Eos % (Auto)   


 


Baso % (Auto)   


 


Neut # (Auto)   


 


Lymph # (Auto)   


 


Mono # (Auto)   


 


Eos # (Auto)   


 


Baso # (Auto)   


 


PT   


 


INR   


 


APTT   


 


Sodium   


 


Potassium   


 


Chloride   


 


Carbon Dioxide   


 


Anion Gap   


 


BUN   


 


Creatinine   


 


Est GFR ( Amer)   


 


Est GFR (Non-Af Amer)   


 


Random Glucose   


 


Hemoglobin A1c   


 


Calcium   


 


Total Bilirubin   


 


AST   


 


ALT   


 


Alkaline Phosphatase   


 


Ammonia   


 


NT-Pro-B Natriuret Pep   


 


Total Protein   


 


Albumin   


 


Globulin   


 


Albumin/Globulin Ratio   


 


Triglycerides   


 


Cholesterol   


 


LDL Cholesterol Direct   


 


HDL Cholesterol   


 


Lipase   


 


Alpha Fetoprotein   


 


Carcinoembryonic Ag   


 


TSH 3rd Generation   


 


Urine Color  Yellow  


 


Urine Clarity  Hazy  


 


Urine pH  5.0  


 


Ur Specific Gravity  1.015  


 


Urine Protein  Negative  


 


Urine Glucose (UA)  Normal  


 


Urine Ketones  Negative  


 


Urine Blood  Negative  


 


Urine Nitrate  Negative  


 


Urine Bilirubin  Negative  


 


Urine Urobilinogen  Normal  


 


Ur Leukocyte Esterase  3+ H  


 


Urine WBC (Auto)  77 H  


 


Urine RBC (Auto)  5 H  


 


Ur Squamous Epith Cells  7 H  


 


Urine Bacteria  Rare  


 


IgG   


 


Hepatitis A IgM Ab    Negative


 


Hep Bs Antigen    Negative


 


Hep B Core IgM Ab    Negative


 


Hepatitis C Antibody    Negative


 


HIV 1&2 Antibody Screen   


 


Blood Type   O POSITIVE 


 


Antibody Screen   Negative 














  04/26/18 04/26/18 04/26/18





  01:00 01:00 04:00


 


WBC   


 


RBC   


 


Hgb   


 


Hct   


 


MCV   


 


MCH   


 


MCHC   


 


RDW   


 


Plt Count   


 


MPV   


 


Neut % (Auto)   


 


Lymph % (Auto)   


 


Mono % (Auto)   


 


Eos % (Auto)   


 


Baso % (Auto)   


 


Neut # (Auto)   


 


Lymph # (Auto)   


 


Mono # (Auto)   


 


Eos # (Auto)   


 


Baso # (Auto)   


 


PT   


 


INR   


 


APTT   


 


Sodium    145


 


Potassium    4.2


 


Chloride    114 H


 


Carbon Dioxide    23


 


Anion Gap    13


 


BUN    33 H


 


Creatinine    1.5 H


 


Est GFR ( Amer)    40


 


Est GFR (Non-Af Amer)    33


 


Random Glucose    79


 


Hemoglobin A1c   


 


Calcium    8.5 L


 


Total Bilirubin    0.5


 


AST    29


 


ALT    6 L D


 


Alkaline Phosphatase    68


 


Ammonia  19  


 


NT-Pro-B Natriuret Pep   2440 H 


 


Total Protein    6.6


 


Albumin    2.3 L


 


Globulin    4.3 H


 


Albumin/Globulin Ratio    0.5 L


 


Triglycerides    69


 


Cholesterol    140


 


LDL Cholesterol Direct    83


 


HDL Cholesterol    21 L


 


Lipase   


 


Alpha Fetoprotein   


 


Carcinoembryonic Ag    2.2


 


TSH 3rd Generation    1.66


 


Urine Color   


 


Urine Clarity   


 


Urine pH   


 


Ur Specific Gravity   


 


Urine Protein   


 


Urine Glucose (UA)   


 


Urine Ketones   


 


Urine Blood   


 


Urine Nitrate   


 


Urine Bilirubin   


 


Urine Urobilinogen   


 


Ur Leukocyte Esterase   


 


Urine WBC (Auto)   


 


Urine RBC (Auto)   


 


Ur Squamous Epith Cells   


 


Urine Bacteria   


 


IgG   


 


Hepatitis A IgM Ab   


 


Hep Bs Antigen   


 


Hep B Core IgM Ab   


 


Hepatitis C Antibody   


 


HIV 1&2 Antibody Screen   


 


Blood Type   


 


Antibody Screen   














  04/26/18 04/26/18 04/26/18





  04:00 07:55 07:55


 


WBC    4.5 L


 


RBC    3.51 L


 


Hgb    10.6 L


 


Hct    31.5 L


 


MCV    89.8


 


MCH    30.2


 


MCHC    33.6


 


RDW    16.5 H


 


Plt Count    199


 


MPV    7.5


 


Neut % (Auto)    68.0


 


Lymph % (Auto)    19.2 L


 


Mono % (Auto)    8.3


 


Eos % (Auto)    3.3


 


Baso % (Auto)    1.2


 


Neut # (Auto)    3.1


 


Lymph # (Auto)    0.9 L


 


Mono # (Auto)    0.4


 


Eos # (Auto)    0.1


 


Baso # (Auto)    0.1


 


PT   


 


INR   


 


APTT   


 


Sodium   


 


Potassium   


 


Chloride   


 


Carbon Dioxide   


 


Anion Gap   


 


BUN   


 


Creatinine   


 


Est GFR ( Amer)   


 


Est GFR (Non-Af Amer)   


 


Random Glucose   


 


Hemoglobin A1c   4.9 


 


Calcium   


 


Total Bilirubin   


 


AST   


 


ALT   


 


Alkaline Phosphatase   


 


Ammonia   


 


NT-Pro-B Natriuret Pep   


 


Total Protein   


 


Albumin   


 


Globulin   


 


Albumin/Globulin Ratio   


 


Triglycerides   


 


Cholesterol   


 


LDL Cholesterol Direct   


 


HDL Cholesterol   


 


Lipase   


 


Alpha Fetoprotein  1.0  


 


Carcinoembryonic Ag   


 


TSH 3rd Generation   


 


Urine Color   


 


Urine Clarity   


 


Urine pH   


 


Ur Specific Gravity   


 


Urine Protein   


 


Urine Glucose (UA)   


 


Urine Ketones   


 


Urine Blood   


 


Urine Nitrate   


 


Urine Bilirubin   


 


Urine Urobilinogen   


 


Ur Leukocyte Esterase   


 


Urine WBC (Auto)   


 


Urine RBC (Auto)   


 


Ur Squamous Epith Cells   


 


Urine Bacteria   


 


IgG   


 


Hepatitis A IgM Ab   


 


Hep Bs Antigen   


 


Hep B Core IgM Ab   


 


Hepatitis C Antibody   


 


HIV 1&2 Antibody Screen  Negative  


 


Blood Type   


 


Antibody Screen   














  04/26/18 04/26/18





  07:55 08:19


 


WBC  


 


RBC  


 


Hgb  


 


Hct  


 


MCV  


 


MCH  


 


MCHC  


 


RDW  


 


Plt Count  


 


MPV  


 


Neut % (Auto)  


 


Lymph % (Auto)  


 


Mono % (Auto)  


 


Eos % (Auto)  


 


Baso % (Auto)  


 


Neut # (Auto)  


 


Lymph # (Auto)  


 


Mono # (Auto)  


 


Eos # (Auto)  


 


Baso # (Auto)  


 


PT  12.9 H 


 


INR  1.2 


 


APTT  


 


Sodium  


 


Potassium  


 


Chloride  


 


Carbon Dioxide  


 


Anion Gap  


 


BUN  


 


Creatinine  


 


Est GFR ( Amer)  


 


Est GFR (Non-Af Amer)  


 


Random Glucose  


 


Hemoglobin A1c  


 


Calcium  


 


Total Bilirubin  


 


AST  


 


ALT  


 


Alkaline Phosphatase  


 


Ammonia  


 


NT-Pro-B Natriuret Pep  


 


Total Protein  


 


Albumin  


 


Globulin  


 


Albumin/Globulin Ratio  


 


Triglycerides  


 


Cholesterol  


 


LDL Cholesterol Direct  


 


HDL Cholesterol  


 


Lipase  


 


Alpha Fetoprotein  


 


Carcinoembryonic Ag  


 


TSH 3rd Generation  


 


Urine Color  


 


Urine Clarity  


 


Urine pH  


 


Ur Specific Gravity  


 


Urine Protein  


 


Urine Glucose (UA)  


 


Urine Ketones  


 


Urine Blood  


 


Urine Nitrate  


 


Urine Bilirubin  


 


Urine Urobilinogen  


 


Ur Leukocyte Esterase  


 


Urine WBC (Auto)  


 


Urine RBC (Auto)  


 


Ur Squamous Epith Cells  


 


Urine Bacteria  


 


IgG   2913.8 H


 


Hepatitis A IgM Ab  


 


Hep Bs Antigen  


 


Hep B Core IgM Ab  


 


Hepatitis C Antibody  


 


HIV 1&2 Antibody Screen  


 


Blood Type  


 


Antibody Screen  














Assessment & Plan





- Assessment and Plan (Free Text)


Assessment: 





Palliative consult


DNR/DNI, no supporting documents on chart, PPS 20%


I reviewed medical records, all diagnostic studies, examined patient in the bed 

and met with her daughter Abbie





Patient is alert, with affect that is pleasant, Chinesse speaking only in no 

acute distress. Patient is frail looking. Right eye closed, lost sight due to 

glaucoma. Skin is dry, poor skin turgor. Shallow breath sounds, O2Sat 97% RA. 

Abdomen firm, distended. Active bowel sounds. No BM since yesterday. 


+1 pedal edema. Patient able to move all extremities. 


Alb 2.3 low and Crt elevated at 2.3 . /66. IVF at 100 cc/hr on board.





I met with patent's daughter Abbie for goals of care discussion. I reviewed 

patient's clinical presentation and elicited daughter's impression. Abbie said 

that her mother's condition had declined since one month ago. patient has been 

mostly on bed rest at home due to distended abdomen and difficulties walking.





I shared with Abbie the CT findings and offered more information regarding 

Liver cirrhosis and ascites.





Code status discussed. Patient made DNR/DNI upon admission . Abbie said that 

she spoke to Doctor in ER about it and agreed with with DNR/DNI. Once she got 

home and discussed it over with her siblings, they decided to change it back to 

Full Code again. Abbie came to hospital with that purpose. 





I first made sure that she understood the meaning of DNR /DNI and asked for her 

rationale for changing it. Abbie said, that her siblings feel patient needed to 

"be given a chance". I further discussed DNR/DNI purpose and offered 

statistical data supporting very poor effect of CPR after the age of 85. Plus 

given patient very complex PMH, I suggested that patient should stay DNR/DNI .





Abbie agreed with me after all, but still felt obligated to talk to her 

siblings again. I offered family meeting for all siblings . Abbie will let me 

know tomorrow.





Impression





* Elderly lady with newly diagnosed liver cirrhosis of unknown origin


* Weakness


* Abdominal distention


* Pedal edema


* SOB


* Family anxiety 











Suggestion





* Would consider Lasix PO for ascites and pedal edema 


*  Lactulose for abdominal distention


* Elevate HOB at 45 degrees


* [id number] for shortness of breath


* I will meet with family again tomorrow for further goals of care discussion


* Full Code at this time














Advance planing time 45 min

## 2018-04-26 NOTE — CP.PCM.PN
Subjective





- Date & Time of Evaluation


Date of Evaluation: 04/26/18


Time of Evaluation: 14:00





- Subjective


Subjective: 





patient seen and examined at bedside


daughter at bedside


spoke to daughter at length about plan for mother


mother describes feeling overall poor


No other complaints at this time





Objective





- Vital Signs/Intake and Output


Vital Signs (last 24 hours): 


 











Temp Pulse Resp BP Pulse Ox


 


 98.0 F   79   20   139/74   97 


 


 04/25/18 23:44  04/25/18 23:44  04/25/18 23:44  04/25/18 23:44  04/25/18 23:44








Intake and Output: 


 











 04/26/18 04/26/18





 06:59 18:59


 


Intake Total 500 


 


Balance 500 














- Labs


Labs: 


 





 04/26/18 07:55 





 04/25/18 18:57 





 











PT  12.9 SECONDS (9.7-12.2)  H  04/26/18  07:55    


 


INR  1.2   04/26/18  07:55    


 


APTT  32 SECONDS (21-34)   04/25/18  18:57    














- Constitutional


Appears: Well





- Head Exam


Head Exam: ATRAUMATIC, NORMAL INSPECTION, NORMOCEPHALIC





- Eye Exam


Additional comments: 





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





- ENT Exam


ENT Exam: Mucous Membranes Moist, Normal Exam





- Neck Exam


Neck Exam: Full ROM, Normal Inspection.  absent: Lymphadenopathy





- Respiratory Exam


Respiratory Exam: Clear to Ausculation Bilateral, NORMAL BREATHING PATTERN





- Cardiovascular Exam


Cardiovascular Exam: REGULAR RHYTHM, JVD, +S1, +S2.  absent: Murmur





- GI/Abdominal Exam


GI & Abdominal Exam: Distended, Soft, Normal Bowel Sounds.  absent: Tenderness


Additional comments: 





dullness to percussion


auscultated splash





- Extremities Exam


Extremities Exam: Full ROM, Normal Capillary Refill, Normal Inspection.  absent

: Joint Swelling, Pedal Edema





- Back Exam


Back Exam: NORMAL INSPECTION





- Neurological Exam


Neurological Exam: Alert, Awake, CN II-XII Intact, Normal Gait, Oriented x3





- Psychiatric Exam


Psychiatric exam: Normal Affect, Normal Mood





- Skin


Skin Exam: Dry, Intact, Normal Color, Warm





Assessment and Plan


(1) Ascites


Assessment & Plan: 


ascities of unknown origin. Patient denies history of alcohol use and hepatitis 

is negative


For para today with IR





Follow up studies:


* AFP


* CEA


* HIV


* IGG


* MABLE


* Immunofixation serum


* Immunofixation urine


* Mitochondrial AB


* Protein Electrophoresis


* Quant


* Smooth muscle AB


* RPR





Follow up Peritoneal fluid


* albumin


* CEA


* cell count


* protein


Status: Acute   





(2) Chronic systolic (congestive) heart failure


Assessment & Plan: 


follow up echo


Status: Acute   





(3) Prophylactic measure


Assessment & Plan: 


SCD contraindicated


Lovenox 40 sc qd


No GI PPX indicated at this time


Palliative care spoke with family and would like to resend DNR at this time. 

Patient is full code. 


Status: Acute

## 2018-04-27 VITALS — OXYGEN SATURATION: 99 % | RESPIRATION RATE: 20 BRPM

## 2018-04-27 VITALS — SYSTOLIC BLOOD PRESSURE: 146 MMHG | TEMPERATURE: 97.2 F | DIASTOLIC BLOOD PRESSURE: 67 MMHG | HEART RATE: 71 BPM

## 2018-04-27 LAB
ALBUMIN SERPL-MCNC: 2.5 G/DL (ref 3.5–5)
ALBUMIN/GLOB SERPL: 0.7 {RATIO} (ref 1–2.1)
ALT SERPL-CCNC: 10 U/L (ref 9–52)
AST SERPL-CCNC: 24 U/L (ref 14–36)
BASOPHILS # BLD AUTO: 0 K/UL (ref 0–0.2)
BASOPHILS NFR BLD: 0.8 % (ref 0–2)
BUN SERPL-MCNC: 30 MG/DL (ref 7–17)
CALCIUM SERPL-MCNC: 8.6 MG/DL (ref 8.6–10.4)
EOSINOPHIL # BLD AUTO: 0.1 K/UL (ref 0–0.7)
EOSINOPHIL NFR BLD: 2.6 % (ref 0–4)
ERYTHROCYTE [DISTWIDTH] IN BLOOD BY AUTOMATED COUNT: 16.3 % (ref 11.5–14.5)
GFR NON-AFRICAN AMERICAN: 33
HGB BLD-MCNC: 10.1 G/DL (ref 11–16)
INR PPP: 1.3
LYMPHOCYTES # BLD AUTO: 0.8 K/UL (ref 1–4.3)
LYMPHOCYTES NFR BLD AUTO: 16.4 % (ref 20–40)
MCH RBC QN AUTO: 30.4 PG (ref 27–31)
MCHC RBC AUTO-ENTMCNC: 34.1 G/DL (ref 33–37)
MCV RBC AUTO: 89.2 FL (ref 81–99)
MONOCYTES # BLD: 0.4 K/UL (ref 0–0.8)
MONOCYTES NFR BLD: 9.4 % (ref 0–10)
NEUTROPHILS # BLD: 3.4 K/UL (ref 1.8–7)
NEUTROPHILS NFR BLD AUTO: 70.8 % (ref 50–75)
NRBC BLD AUTO-RTO: 0 % (ref 0–2)
PLATELET # BLD: 161 K/UL (ref 130–400)
PMV BLD AUTO: 7.5 FL (ref 7.2–11.7)
PROTHROMBIN TIME: 14.3 SECONDS (ref 9.7–12.2)
RBC # BLD AUTO: 3.33 MIL/UL (ref 3.8–5.2)
WBC # BLD AUTO: 4.7 K/UL (ref 4.8–10.8)

## 2018-04-27 RX ADMIN — ENOXAPARIN SODIUM SCH MG: 30 INJECTION SUBCUTANEOUS at 10:07

## 2018-04-27 RX ADMIN — CIPROFLOXACIN SCH MLS/HR: 2 INJECTION, SOLUTION INTRAVENOUS at 10:06

## 2018-04-27 NOTE — CP.PCM.PN
<Reuben Rashid - Last Filed: 04/27/18 10:29>





Subjective





- Date & Time of Evaluation


Date of Evaluation: 04/27/18


Time of Evaluation: 06:28





- Subjective


Subjective: 





PGY5 GI Fellow Progress Note


Patient seen and examined bedside this morning. The patient denies any 

complaints today. Had paracentesis yesterday. No events overnight.





12 system ROS limited given dementia





Objective





- Vital Signs/Intake and Output


Vital Signs (last 24 hours): 


 











Temp Pulse Resp BP Pulse Ox


 


 98.7 F   82   18   119/53 L  97 


 


 04/26/18 23:46  04/26/18 23:46  04/26/18 23:46  04/26/18 23:46  04/26/18 23:46








Intake and Output: 


 











 04/26/18 04/27/18





 18:59 06:59


 


Intake Total 500 350


 


Output Total 100 


 


Balance 400 350














- Medications


Medications: 


 Current Medications





Enoxaparin Sodium (Lovenox)  30 mg SC DAILY [facility]


   Last Admin: 04/26/18 12:01 Dose:  Not Given


Ciprofloxacin (Cipro 200mg/100ml D5w)  100 mls @ 133 mls/hr IVPB Q12H [facility]


   PRN Reason: Protocol


   Last Admin: 04/26/18 21:35 Dose:  133 mls/hr











- Labs


Labs: 


 





 04/26/18 07:55 





 04/26/18 04:00 





 











PT  12.9 SECONDS (9.7-12.2)  H  04/26/18  07:55    


 


INR  1.2   04/26/18  07:55    


 


APTT  32 SECONDS (21-34)   04/25/18  18:57    














- Constitutional


Appears: Non-toxic, No Acute Distress





- Eye Exam


Eye Exam: EOMI, PERRL





- ENT Exam


ENT Exam: Mucous Membranes Moist





- Respiratory Exam


Respiratory Exam: Clear to Ausculation Bilateral.  absent: Rales, Rhonchi, 

Wheezes





- Cardiovascular Exam


Cardiovascular Exam: RRR, +S1, +S2





- GI/Abdominal Exam


GI & Abdominal Exam: Soft, Normal Bowel Sounds.  absent: Distended, Firm, 

Guarding, Rigid, Tenderness, Organomegaly


Additional comments: 





significant improvement following LVP





- Extremities Exam


Additional comments: 





1+ LE edema





- Neurological Exam


Neurological Exam: Alert, Awake, Oriented x3





- Psychiatric Exam


Psychiatric exam: Normal Affect, Normal Mood





- Skin


Skin Exam: Dry, Warm





Assessment and Plan





- Assessment and Plan (Free Text)


Assessment: 





Patient is an 88yo Chinese female with PMHx significant for CAD s/p PCI, H/O 

pacemaker insertion, CKD, TIA, dementia, previous subdural hemorrhage following 

a fall who presented to the ED after evaluation in our office yesterday for 

worsening abdominal distention.


-Decompensated cirrhosis of unknown etiology c/b large volume ascites s/p large 

volume paracentesis (7L)


-CAD


-Systolic CHF with prior echocardiogram with EF 45%


-CKD


-Dementia


Plan: 


-Cr unchanged at 1.5; consider light hydration - recommend nephrology evaluation


-FeNa consistent with prerenal disease


-S/P LVP with 7L out; albumin replaced at 6g/L removed


-Patient will ultimately benefit from diuretic therapy if kidney function 

improves, must hold for now


-If patient cannot tolerate diuretic therapy, would recommend periodic 

paracentesis


-Will need triple phase CT scan of liver for liver lesions - AFP WNL


-Echocardiogram ordered


-Awaiting ascitic fluid analysis (albumin/total protein) - cell count not c/w 

SBP; need to calculate SAAG


-Autoimmune work up pending: MABLE, AMA, ASMA; IgG level elevation noted


-Patient can be D/C with outpatient GI follow up to go over results





<Danish Mercado - Last Filed: 04/27/18 11:17>





Objective





- Vital Signs/Intake and Output


Vital Signs (last 24 hours): 


 











Temp Pulse Resp BP Pulse Ox


 


 97.7 F   67   20   145/52 L  99 


 


 04/27/18 08:08  04/27/18 08:08  04/27/18 08:08  04/27/18 08:08  04/27/18 08:08








Intake and Output: 


 











 04/27/18 04/27/18





 06:59 18:59


 


Intake Total 410 


 


Balance 410 














- Medications


Medications: 


 Current Medications





Enoxaparin Sodium (Lovenox)  30 mg SC DAILY [facility]


   Last Admin: 04/27/18 10:07 Dose:  30 mg


Ciprofloxacin (Cipro 200mg/100ml D5w)  100 mls @ 133 mls/hr IVPB Q12H [facility]


   PRN Reason: Protocol


   Last Admin: 04/27/18 10:06 Dose:  133 mls/hr


Sodium Chloride (Sodium Chloride 0.9%)  1,000 mls @ 50 mls/hr IV .Q20H [facility]


   Last Admin: 04/27/18 10:44 Dose:  50 mls/hr











- Labs


Labs: 


 





 04/27/18 06:40 





 04/27/18 06:40 





 











PT  14.3 SECONDS (9.7-12.2)  H  04/27/18  06:40    


 


INR  1.3   04/27/18  06:40    


 


APTT  32 SECONDS (21-34)   04/25/18  18:57    














Attending/Attestation





- Attestation


I have personally seen and examined this patient.: Yes


I have fully participated in the care of the patient.: Yes


I have reviewed all pertinent clinical information, including history, physical 

exam and plan: Yes


Notes (Text): 





04/27/18 11:15


89 year old female admitted with decompensated cirrhosis with ascites. S/p 

large volume paracentesis. No SBP. Await albumin. Etiology of cirrhosis 

unclear. Await echo. Consider nephrology evaluation for CKD. Will need 

diuretics. Sodium restricted diet.

## 2018-04-27 NOTE — CARD
--------------- APPROVED REPORT --------------





EKG Measurement

Heart [id number]

WV [id number]

[id number]

[id number]

[id number]



<Conclusion>

Atrial-sensed ventricular-paced rhythm

Abnormal ECG

## 2018-04-27 NOTE — CARD
--------------- APPROVED REPORT --------------





EXAM: Two-dimensional and M-mode echocardiogram with Doppler and 

color Doppler.



Other Information 

Quality : GoodRhythm : 



INDICATION

Peripheral Edema Congestive Heart Failure Ascites



2D DIMENSIONS 

IVSd1.2   (0.7-1.1cm)LVDd3.6   (3.9-5.9cm)

PWd0.8   (0.7-1.1cm)LVDs3.1   (2.5-4.0cm)

FS (%) 13.7   %LVEF (%)55.0   (>50%)



M-Mode DIMENSIONS 

Left Atrium (MM)3.70   (2.5-4.0cm)Aortic Root3.19   (2.2-3.7cm)

Aortic Cusp Exc.1.71   (1.5-2.0cm)



Mitral Valve

MV E Labrjdjl57.2cm/sMV A Vveexrba909.1cm/sE/A ratio0.4



TDI

E/Lateral E'0.0E/Medial E'0.0



Tricuspid Valve

TR Peak Ugurymkf998ae/sTR Peak Gr.20mmHg



 LEFT VENTRICLE 

The left ventricle is normal size.

There is mild concentric left ventricular hypertrophy.

The left ventricular ejection fraction is within the normal range.

Apical motion consistent with pacemaker activation.



 RIGHT VENTRICLE 

The right ventricle is normal size.

There is normal right ventricular wall thickness.

The right ventricular systolic function is normal.

There is a pacemaker lead in the right ventricle.



 ATRIA 

The left atrium size is normal.

The right atrium size is normal.



 AORTIC VALVE 

The aortic valve is moderately thickened.

There is mild aortic regurgitation.

There is no aortic valvular stenosis. 



 MITRAL VALVE 

The mitral valve is moderately thickened.

Mitral regurgitation is moderate.



 TRICUSPID VALVE 

There is mild tricuspid regurgitation.



 PULMONIC VALVE 

There is mild pulmonic valvular regurgitation. 



 GREAT VESSELS 

The aortic root is normal in size.



<Conclusion>

The left ventricle is normal size.

There is mild concentric left ventricular hypertrophy.

The left ventricular ejection fraction is within the normal range.

Apical motion consistent with pacemaker activation.

There is mild aortic regurgitation.

Mitral regurgitation is moderate.

## 2018-04-27 NOTE — CP.PCM.DIS
Provider





- Provider


Date of Admission: 


04/25/18 21:42





Attending physician: 


Teo Gillette MD





Primary care physician: 





Adore


Consults: 





GI: Uriah


Time Spent in preparation of Discharge (in minutes): 45





Diagnosis





- Discharge Diagnosis


(1) Ascites


Status: Acute   





(2) Chronic systolic (congestive) heart failure


Status: Acute   





(3) Prophylactic measure


Status: Acute   





Hospital Course





- Lab Results


Lab Results: 


 Micro Results





04/26/18 Unknown   Ascitic  Fluid   Gram Stain - Final


04/26/18 Unknown   Ascitic  Fluid   Body Fluid Culture - Preliminary


                                NO GROWTH AFTER 24 HOURS


04/25/18 20:06   Urine,Clean Catch   Urine Culture - Final


                            Gram Negative Jacques





 Most Recent Lab Values











WBC  4.7 K/uL (4.8-10.8)  L  04/27/18  06:40    


 


RBC  3.33 Mil/uL (3.80-5.20)  L  04/27/18  06:40    


 


Hgb  10.1 g/dL (11.0-16.0)  L  04/27/18  06:40    


 


Hct  29.7 % (34.0-47.0)  L  04/27/18  06:40    


 


MCV  89.2 fL (81.0-99.0)   04/27/18  06:40    


 


MCH  30.4 pg (27.0-31.0)   04/27/18  06:40    


 


MCHC  34.1 g/dL (33.0-37.0)   04/27/18  06:40    


 


RDW  16.3 % (11.5-14.5)  H  04/27/18  06:40    


 


Plt Count  161 K/uL (130-400)   04/27/18  06:40    


 


MPV  7.5 fL (7.2-11.7)   04/27/18  06:40    


 


Neut % (Auto)  70.8 % (50.0-75.0)   04/27/18  06:40    


 


Lymph % (Auto)  16.4 % (20.0-40.0)  L  04/27/18  06:40    


 


Mono % (Auto)  9.4 % (0.0-10.0)   04/27/18  06:40    


 


Eos % (Auto)  2.6 % (0.0-4.0)   04/27/18  06:40    


 


Baso % (Auto)  0.8 % (0.0-2.0)   04/27/18  06:40    


 


Neut # (Auto)  3.4 K/uL (1.8-7.0)   04/27/18  06:40    


 


Lymph # (Auto)  0.8 K/uL (1.0-4.3)  L  04/27/18  06:40    


 


Mono # (Auto)  0.4 K/uL (0.0-0.8)   04/27/18  06:40    


 


Eos # (Auto)  0.1 K/uL (0.0-0.7)   04/27/18  06:40    


 


Baso # (Auto)  0.0 K/uL (0.0-0.2)   04/27/18  06:40    


 


PT  14.3 SECONDS (9.7-12.2)  H  04/27/18  06:40    


 


INR  1.3   04/27/18  06:40    


 


APTT  32 SECONDS (21-34)   04/25/18  18:57    


 


Sodium  146 mmol/L (132-148)   04/27/18  06:40    


 


Potassium  4.2 mmol/L (3.6-5.2)   04/27/18  06:40    


 


Chloride  112 mmol/L ()  H  04/27/18  06:40    


 


Carbon Dioxide  25 mmol/L (22-30)   04/27/18  06:40    


 


Anion Gap  13  (10-20)   04/27/18  06:40    


 


BUN  30 mg/dL (7-17)  H  04/27/18  06:40    


 


Creatinine  1.5 mg/dL (0.7-1.2)  H  04/27/18  06:40    


 


Est GFR ( Amer)  40   04/27/18  06:40    


 


Est GFR (Non-Af Amer)  33   04/27/18  06:40    


 


Random Glucose  80 mg/dL ()   04/27/18  06:40    


 


Hemoglobin A1c  4.9 % (4.2-6.5)   04/26/18  07:55    


 


Calcium  8.6 mg/dl (8.6-10.4)   04/27/18  06:40    


 


Total Bilirubin  0.7 mg/dL (0.2-1.3)   04/27/18  06:40    


 


AST  24 U/L (14-36)   04/27/18  06:40    


 


ALT  10 U/L (9-52)   04/27/18  06:40    


 


Alkaline Phosphatase  48 U/L ()   04/27/18  06:40    


 


Ammonia  19 umol/L (9-33)   04/26/18  01:00    


 


NT-Pro-B Natriuret Pep  2440 pg/mL (0-900)  H  04/26/18  01:00    


 


Total Protein  6.0 g/dL (6.3-8.3)  L  04/27/18  06:40    


 


Albumin  2.5 g/dL (3.5-5.0)  L  04/27/18  06:40    


 


Globulin  3.5 gm/dL (2.2-3.9)   04/27/18  06:40    


 


Albumin/Globulin Ratio  0.7  (1.0-2.1)  L  04/27/18  06:40    


 


Triglycerides  69 mg/dL (0-149)   04/26/18  04:00    


 


Cholesterol  140 mg/dL (0-199)   04/26/18  04:00    


 


LDL Cholesterol Direct  83 mg/dL (0-129)   04/26/18  04:00    


 


HDL Cholesterol  21 mg/dL (30-70)  L  04/26/18  04:00    


 


Lipase  143 U/L ()   04/25/18  18:57    


 


Alpha Fetoprotein  1.0 ng/mL (0.0-7.5)   04/26/18  04:00    


 


Carcinoembryonic Ag  2.2 ng/mL (0-3.0)   04/26/18  04:00    


 


TSH 3rd Generation  1.66 mIU/L (0.46-4.68)   04/26/18  04:00    


 


Urine Color  Yellow  (YELLOW)   04/25/18  19:19    


 


Urine Clarity  Hazy  (Clear)   04/25/18  19:19    


 


Urine pH  5.0  (5.0-8.0)   04/25/18  19:19    


 


Ur Specific Gravity  1.015  (1.003-1.030)   04/25/18  19:19    


 


Urine Protein  Negative mg/dL (NEGATIVE)   04/25/18  19:19    


 


Urine Glucose (UA)  Normal mg/dL (Normal)   04/25/18  19:19    


 


Urine Ketones  Negative mg/dL (NEGATIVE)   04/25/18  19:19    


 


Urine Blood  Negative  (NEGATIVE)   04/25/18  19:19    


 


Urine Nitrate  Negative  (NEGATIVE)   04/25/18  19:19    


 


Urine Bilirubin  Negative  (NEGATIVE)   04/25/18  19:19    


 


Urine Urobilinogen  Normal mg/dL (0.2-1.0)   04/25/18  19:19    


 


Ur Leukocyte Esterase  3+ Robert/uL (Negative)  H  04/25/18  19:19    


 


Urine WBC (Auto)  77 /hpf (0-5)  H  04/25/18  19:19    


 


Urine RBC (Auto)  5 /hpf (0-3)  H  04/25/18  19:19    


 


Ur Squamous Epith Cells  7 /hpf (0-5)  H  04/25/18  19:19    


 


Urine Bacteria  Rare  (<OCC)   04/25/18  19:19    


 


Ur Random Creatinine  101.3 mg/dL  04/26/18  13:34    


 


Ur Random Sodium  38 mmol/L  04/26/18  13:34    


 


Ur Random Urea Nitrogn  800 mg/dL  04/26/18  13:34    


 


Fluid Source  Peritoneal/ascites   04/26/18  16:51    


 


Fluid Appearance  Clear  (CLEAR)   04/26/18  16:51    


 


Fluid WBC  12.0 /mm3 (0.0-300.0)   04/26/18  16:51    


 


Fluid RBC  8.0 /mm3 (0.0-0.0)  H  04/26/18  16:51    


 


Fluid Tot Cell Count  100  (0-0)  H  04/26/18  16:51    


 


Fluid Neutrophils  8.0 % (0-0)  H  04/26/18  16:51    


 


Fluid Lymphocytes  57.0 % (0-0)  H  04/26/18  16:51    


 


Fld Monocyte/Macrophag  35 % (0-0)  H  04/26/18  16:51    


 


Fluid Comment     04/26/18  16:51    


 


IgG  2913.8 mg/dL (700.0-1600.0)  H  04/26/18  08:19    


 


RPR  Nonreactive  (NONREACTIVE)   04/26/18  08:10    


 


Hepatitis A IgM Ab  Negative  (NEGATIVE)   04/26/18  00:45    


 


Hep Bs Antigen  Negative  (NEGATIVE)   04/26/18  00:45    


 


Hep B Core IgM Ab  Negative  (NEGATIVE)   04/26/18  00:45    


 


Hepatitis C Antibody  Negative  (NEGATIVE)   04/26/18  00:45    


 


HIV 1&2 Antibody Screen  Negative  (NEGATIVE)   04/26/18  04:00    


 


Blood Type  O POSITIVE   04/25/18  21:58    


 


Antibody Screen  Negative   04/25/18  21:58    














- Hospital Course


Hospital Course: 





This patient is an 89yo Cantonese speaking F (actually 89 according to daughter)

, who is coming to the [facility] from GI clinic with severe ascites. The patient first 

noticed her distended abdomen around Mary time in 2017 and originally went 

to another hospital which diagnosed her with constipation initially, and then 

saw that she had ascitic fluid, and decided to give her a prescription for 

lasix which helped mildly. The patient has since run out of lasix for the past 

week, and her abdomen has gotten markedly more distended. The patient denies 

any fevers/chills, or abdominal pain. She admits to shortness of breath, and 

increased constipation in regards to the increasing ascites. She denies any 

current symptoms of fevers/chills, HA, abdominal pain, N/V/D, dysuria/freq/

urgency or lower extremity pain. Daugther states at baseline patient gets 

swollen legs, and that today they seem better than normal. 





Hospital Course: Patient was admitted with worsening ascitis. She was drained 7 

L. Fluid analysis was done. No SBP. The remainder of the fluid studies are 

pending. Was evaluated by GI for cause of her Ascitis. Echo report still 

pending. Can follow up remainder of studies and echo as outpatient. She is 

requesting a new PMD so was given Dr. Chandra information. She was also 

given scripts for month paracentesis with Dr. Aguilar. 





Discharge Exam





- Head Exam


Head Exam: ATRAUMATIC, NORMAL INSPECTION, NORMOCEPHALIC





- Eye Exam


Eye Exam: EOMI, Normal appearance, PERRL


Pupil Exam: NORMAL ACCOMODATION, PERRL





- Respiratory Exam


Respiratory Exam: Clear to PA & Lateral, NORMAL BREATHING PATTERN, UNREMARKABLE





- Cardiovascular Exam


Cardiovascular Exam: REGULAR RHYTHM





- GI/Abdominal Exam


GI & Abdominal Exam: Normal Bowel Sounds, Soft, Unremarkable.  absent: Distended

, Guarding, Tenderness





- Neurological Exam


Neurological exam: Alert, CN II-XII Intact, Normal Gait, Oriented x3, Reflexes 

Normal





- Psychiatric Exam


Psychiatric exam: Normal Affect, Normal Mood





- Skin


Skin Exam: Dry, Intact, Normal Color, Warm





Discharge Plan





- Discharge Medications


Prescriptions: 


Furosemide [Lasix] 40 mg PO DAILY 30 Days  tablet


Spironolactone [Aldactone] 25 mg PO BID 30 Days  tablet





- Follow Up Plan


Condition: FAIR


Disposition: HOME/ ROUTINE


Instructions:  Heart Failure, Adult (DC), Cirrhosis (DC), Fluid in the Belly (

Ascites) (DC)


Additional Instructions: 


Please follow up with Dr. Avitia in 7-10 days. I have attached his office 

information. Please call to make an appointment. 


Please follow up with Dr. Kruse 7-10 days. I have attached his office 

information. Please call to make an appointment.


The nurse will provide you with a list of medications to take. Please take 

these medications as directed. Please bring the medications with you to Dr. Chandra office 


Please call Dr. Haile office to set up monthly paracentesis. I have attached 

his office information


Referrals: 


Jeremie Aguilar MD [Staff Provider] - 


Justus Kruse MD [Staff Provider] - 


Haider Avitia MD [Staff Provider] -

## 2018-04-28 LAB — TB ANTIGEN MINUS NIL: <0 IU/ML

## 2018-05-01 LAB — PROT PRT-MCNC: <3 G/DL

## 2018-05-31 ENCOUNTER — HOSPITAL ENCOUNTER (OUTPATIENT)
Dept: HOSPITAL 31 - C.SPRAD | Age: 83
Discharge: HOME | End: 2018-05-31
Attending: RADIOLOGY
Payer: MEDICARE

## 2018-05-31 VITALS
OXYGEN SATURATION: 97 % | RESPIRATION RATE: 18 BRPM | DIASTOLIC BLOOD PRESSURE: 74 MMHG | HEART RATE: 78 BPM | SYSTOLIC BLOOD PRESSURE: 132 MMHG | TEMPERATURE: 97.7 F

## 2018-05-31 DIAGNOSIS — Z88.0: ICD-10-CM

## 2018-05-31 DIAGNOSIS — R18.8: Primary | ICD-10-CM

## 2018-05-31 DIAGNOSIS — Z88.2: ICD-10-CM

## 2018-05-31 NOTE — PCM.SURG1
Surgeon's Initial Post Op Note





- Surgeon's Notes


Surgeon: Jeremie Khan MD


Assistant: NONE


Type of Anesthesia: Local


Pre-Operative Diagnosis: Ascites


Operative Findings: US showed moderate ascites


Post-Operative Diagnosis: Ascites


Operation Performed: US guided paracentesis


Specimen/Specimens Removed: 4.5 liters of straw colored fluid


Estimated Blood Loss: EBL {In ML}: 0


Blood Products Given: N/A


Drains Used: No Drains


Post-Op Condition: Fair


Date of Surgery/Procedure: 05/31/18


Time of Surgery/Procedure: 11:45

## 2018-05-31 NOTE — CP.SDSHP
Same Day Surgery [facility]





- History


Proposed Procedure: US guided paracentesis


Pre-Op Diagnosis: ASCITES





- Allergies


Allergies: 


Allergies





Penicillins Allergy (Verified 04/25/18 18:11)


 


Sulfa (Sulfonamide Antibiotics) Allergy (Verified 04/25/18 18:11)


 











- Physical Exam


Vital Signs: 


 Vital Signs











  05/31/18





  09:01


 


Temperature 97.7 F


 


Pulse Rate 78


 


Respiratory 18





Rate 


 


Blood Pressure 132/74


 


O2 Sat by Pulse 97





Oximetry 











Mental Status: Alert & Oriented x3


Neuro: WNL


Heart: WNL


Lungs: WNL





- {Optional Preform as Required}


Abdomen: Other (slightly distended, non tender)





- Impression


Impression: US showed moderate ascites. Plan US guided paracentesis.


Pt. Evaluated Today:Candidate for Anesthesia & Procedure: No





Short Stay Discharge





- Short Stay Discharge


Admitting Diagnosis/Reason for Visit: DX: Ascites


Disposition: HOME/ ROUTINE

## 2018-05-31 NOTE — US
Date of Procedure: 05/31/2018



PROCEDURE: Ultrasound-guided paracentesis, CPT 25776



Medications: 7 cc 1% Lidocaine







HISTORY:

Ascites, abdominal pain



TECHNIQUE:





Following informed consent , the patient was placed supine on the 

stretcher and the site was marked. A limited abdominal ultrasound was 

performed that showed a large amount of intra-abdominal fluid. 

Procedural time out was called and the Pt's abdomen was marked and 

prepped and draped in the usual sterile fashion. Ultrasound-guided 

large volume paracentesis performed.  A total of 4.5 liters of straw 

colored fluid was removed without complication. 



IMPRESSION:





Ultrasound-guided large volume paracentesis.

## 2018-07-27 ENCOUNTER — HOSPITAL ENCOUNTER (OUTPATIENT)
Dept: HOSPITAL 31 - C.SPRAD | Age: 83
Discharge: HOME | End: 2018-07-27
Attending: RADIOLOGY
Payer: MEDICARE

## 2018-07-27 VITALS — BODY MASS INDEX: 17.4 KG/M2

## 2018-07-27 DIAGNOSIS — R18.8: Primary | ICD-10-CM

## 2018-07-27 NOTE — US
Date of Procedure: 07/27/2018



PROCEDURE: Ultrasound-guided paracentesis, CPT 07790



Medications: 7 cc 1% Lidocaine







HISTORY:

Ascites, abdominal pain



TECHNIQUE:





Following informed consent , the patient was placed supine on the 

stretcher and the site was marked. A limited abdominal ultrasound was 

performed that showed a moderate amount of intra-abdominal fluid. 

Procedural time out was called and the Pt's abdomen was marked and 

prepped and draped in the usual sterile fashion. Ultrasound-guided 

large volume paracentesis performed.  A total of 2.5 liters of straw 

colored fluid was removed without complication. 



IMPRESSION:





Ultrasound-guided paracentesis.  A total of 2.5 liters of straw 

colored fluid was removed without complication.

## 2018-07-27 NOTE — PCM.SURG1
Surgeon's Initial Post Op Note





- Surgeon's Notes


Surgeon: Jeremie Aguilar md


Assistant: none


Type of Anesthesia: Local


Pre-Operative Diagnosis: Ascites


Operative Findings: US showed moderate ascites


Post-Operative Diagnosis: Ascites


Operation Performed: US guided paracentesis


Specimen/Specimens Removed: 2.5 liters


Estimated Blood Loss: EBL {In ML}: 0


Blood Products Given: N/A


Drains Used: No Drains


Post-Op Condition: Fair


Date of Surgery/Procedure: 07/27/18


Time of Surgery/Procedure: 10:45

## 2018-07-27 NOTE — CP.SDSHP
Same Day Surgery [facility]





- History


Proposed Procedure: US guided paracentesis


Pre-Op Diagnosis: Ascites





- Allergies


Allergies: 


Allergies





Penicillins Allergy (Verified 04/25/18 18:11)


 


Sulfa (Sulfonamide Antibiotics) Allergy (Verified 04/25/18 18:11)


 











- Impression


Impression: Pt with moderate ascites referred for paracentesis. Informed 

consent obtained from daughter. Plan US guided paracentesis.


Pt. Evaluated Today:Candidate for Anesthesia & Procedure: No





- Date & Time


Date: 07/27/18


Time: 10:00





Short Stay Discharge





- Short Stay Discharge


Admitting Diagnosis/Reason for Visit: ASCITES


Disposition: HOME/ ROUTINE

## 2018-10-03 ENCOUNTER — HOSPITAL ENCOUNTER (OUTPATIENT)
Dept: HOSPITAL 31 - C.SPRAD | Age: 83
Discharge: HOME | End: 2018-10-03
Attending: RADIOLOGY
Payer: MEDICARE

## 2018-10-03 VITALS — BODY MASS INDEX: 17.6 KG/M2

## 2018-10-03 DIAGNOSIS — R18.8: Primary | ICD-10-CM

## 2018-10-03 NOTE — US
Date of Procedure: 10/0/47658



PROCEDURE: Ultrasound-guided paracentesis, CPT 79486



Medications: 7 cc 1% Lidocaine







HISTORY:

Ascites, abdominal pain



TECHNIQUE:





Following informed consent , the patient was placed supine on the 

stretcher and the site was marked. A limited abdominal ultrasound was 

performed that showed a large amount of intra-abdominal fluid. 

Procedural time out was called and the Pt's abdomen was marked and 

prepped and draped in the usual sterile fashion. Ultrasound-guided 

large volume paracentesis performed.  A total of 4 liters of straw 

colored fluid was removed without complication. Fluid specimen was 

sent for culture, sensitivity, cytology and chemistries.



IMPRESSION:





Ultrasound-guided large volume paracentesis.

## 2018-10-03 NOTE — CP.SDSHP
Same Day Surgery [facility]





- History


Proposed Procedure: Paracentesis


Pre-Op Diagnosis: Ascites





- Allergies


Allergies: 


Allergies





Penicillins Allergy (Verified 04/25/18 18:11)


   


Sulfa (Sulfonamide Antibiotics) Allergy (Verified 04/25/18 18:11)


   











- Physical Exam


Mental Status: Confused





- {Optional Preform as Required}


Abdomen: Other (distended.)





- Impression


Impression: Pt with ascites referred for paracentesis. Plan US guided 

paracentesis.


Pt. Evaluated Today:Candidate for Anesthesia & Procedure: No





- Date & Time


Date: 10/03/18


Time: 10:05





Short Stay Discharge





- Short Stay Discharge


Admitting Diagnosis/Reason for Visit: PARACENTESIS


Disposition: HOME/ ROUTINE